# Patient Record
Sex: MALE | NOT HISPANIC OR LATINO | URBAN - METROPOLITAN AREA
[De-identification: names, ages, dates, MRNs, and addresses within clinical notes are randomized per-mention and may not be internally consistent; named-entity substitution may affect disease eponyms.]

---

## 2018-03-23 ENCOUNTER — HOSPITAL ENCOUNTER (INPATIENT)
Facility: REHABILITATION | Age: 63
LOS: 7 days | Discharge: HOME | DRG: 560 | End: 2018-03-30
Attending: PHYSICAL MEDICINE & REHABILITATION | Admitting: PHYSICAL MEDICINE & REHABILITATION
Payer: OTHER GOVERNMENT

## 2018-03-23 DIAGNOSIS — R60.0 BILATERAL LOWER EXTREMITY EDEMA: ICD-10-CM

## 2018-03-23 DIAGNOSIS — Z96.653 S/P TOTAL KNEE ARTHROPLASTY, BILATERAL: Primary | ICD-10-CM

## 2018-03-23 DIAGNOSIS — F34.1 DYSTHYMIC DISORDER: ICD-10-CM

## 2018-03-23 PROBLEM — R80.9 PROTEINURIA: Chronic | Status: ACTIVE | Noted: 2018-03-23

## 2018-03-23 PROBLEM — J45.20 MILD INTERMITTENT ASTHMA: Chronic | Status: ACTIVE | Noted: 2018-03-23

## 2018-03-23 PROBLEM — E11.9 DM2 (DIABETES MELLITUS, TYPE 2) (CMS/HCC): Chronic | Status: ACTIVE | Noted: 2018-03-23

## 2018-03-23 PROBLEM — E03.9 HYPOTHYROID: Chronic | Status: ACTIVE | Noted: 2018-03-23

## 2018-03-23 PROBLEM — J98.11 ATELECTASIS: Status: ACTIVE | Noted: 2018-03-23

## 2018-03-23 PROBLEM — D50.0 ANEMIA DUE TO BLOOD LOSS: Status: ACTIVE | Noted: 2018-03-23

## 2018-03-23 PROBLEM — E78.2 MIXED HYPERLIPIDEMIA: Chronic | Status: ACTIVE | Noted: 2018-03-23

## 2018-03-23 PROCEDURE — 63700000 HC SELF-ADMINISTRABLE DRUG: Performed by: HOSPITALIST

## 2018-03-23 PROCEDURE — 12800000 HC ROOM AND CARE SEMIPRIVATE REHAB

## 2018-03-23 PROCEDURE — 87086 URINE CULTURE/COLONY COUNT: CPT | Performed by: HOSPITALIST

## 2018-03-23 PROCEDURE — 81001 URINALYSIS AUTO W/SCOPE: CPT | Performed by: HOSPITALIST

## 2018-03-23 RX ORDER — MONTELUKAST SODIUM 10 MG/1
10 TABLET ORAL NIGHTLY
COMMUNITY

## 2018-03-23 RX ORDER — PANTOPRAZOLE SODIUM 40 MG/1
40 TABLET, DELAYED RELEASE ORAL
Status: DISCONTINUED | OUTPATIENT
Start: 2018-03-24 | End: 2018-03-30 | Stop reason: HOSPADM

## 2018-03-23 RX ORDER — ALBUTEROL SULFATE 2.5 MG/.5ML
2.5 SOLUTION RESPIRATORY (INHALATION) EVERY 6 HOURS PRN
Status: DISCONTINUED | OUTPATIENT
Start: 2018-03-23 | End: 2018-03-30 | Stop reason: HOSPADM

## 2018-03-23 RX ORDER — ACETAMINOPHEN 325 MG/1
650 TABLET ORAL EVERY 4 HOURS PRN
Status: DISCONTINUED | OUTPATIENT
Start: 2018-03-23 | End: 2018-03-30 | Stop reason: HOSPADM

## 2018-03-23 RX ORDER — CITALOPRAM 20 MG/1
40 TABLET, FILM COATED ORAL DAILY
Status: DISCONTINUED | OUTPATIENT
Start: 2018-03-24 | End: 2018-03-30 | Stop reason: HOSPADM

## 2018-03-23 RX ORDER — LISINOPRIL 2.5 MG/1
1.25 TABLET ORAL DAILY
Status: DISCONTINUED | OUTPATIENT
Start: 2018-03-24 | End: 2018-03-30 | Stop reason: HOSPADM

## 2018-03-23 RX ORDER — ALBUTEROL SULFATE 90 UG/1
2 INHALANT RESPIRATORY (INHALATION) EVERY 4 HOURS PRN
COMMUNITY

## 2018-03-23 RX ORDER — IBUPROFEN 200 MG
16-32 TABLET ORAL AS NEEDED
Status: DISCONTINUED | OUTPATIENT
Start: 2018-03-23 | End: 2018-03-30 | Stop reason: HOSPADM

## 2018-03-23 RX ORDER — DEXTROSE 50 % IN WATER (D50W) INTRAVENOUS SYRINGE
25 AS NEEDED
Status: DISCONTINUED | OUTPATIENT
Start: 2018-03-23 | End: 2018-03-30 | Stop reason: HOSPADM

## 2018-03-23 RX ORDER — BISACODYL 10 MG/1
10 SUPPOSITORY RECTAL DAILY PRN
Status: DISCONTINUED | OUTPATIENT
Start: 2018-03-23 | End: 2018-03-30 | Stop reason: HOSPADM

## 2018-03-23 RX ORDER — LEVOTHYROXINE SODIUM 88 UG/1
88 TABLET ORAL DAILY
COMMUNITY

## 2018-03-23 RX ORDER — ASPIRIN 325 MG
325 TABLET, DELAYED RELEASE (ENTERIC COATED) ORAL DAILY
Status: DISCONTINUED | OUTPATIENT
Start: 2018-03-24 | End: 2018-03-30 | Stop reason: HOSPADM

## 2018-03-23 RX ORDER — ADHESIVE BANDAGE
30 BANDAGE TOPICAL 2 TIMES DAILY PRN
Status: DISCONTINUED | OUTPATIENT
Start: 2018-03-23 | End: 2018-03-30 | Stop reason: HOSPADM

## 2018-03-23 RX ORDER — MONTELUKAST SODIUM 10 MG/1
10 TABLET ORAL NIGHTLY
Status: DISCONTINUED | OUTPATIENT
Start: 2018-03-23 | End: 2018-03-30 | Stop reason: HOSPADM

## 2018-03-23 RX ORDER — AMOXICILLIN 250 MG
1 CAPSULE ORAL 2 TIMES DAILY
Status: DISCONTINUED | OUTPATIENT
Start: 2018-03-23 | End: 2018-03-30 | Stop reason: HOSPADM

## 2018-03-23 RX ORDER — CELECOXIB 200 MG/1
200 CAPSULE ORAL DAILY
Status: DISCONTINUED | OUTPATIENT
Start: 2018-03-24 | End: 2018-03-30 | Stop reason: HOSPADM

## 2018-03-23 RX ORDER — CITALOPRAM 40 MG/1
40 TABLET, FILM COATED ORAL DAILY
COMMUNITY

## 2018-03-23 RX ORDER — DEXTROSE 40 %
15-30 GEL (GRAM) ORAL AS NEEDED
Status: DISCONTINUED | OUTPATIENT
Start: 2018-03-23 | End: 2018-03-30 | Stop reason: HOSPADM

## 2018-03-23 RX ORDER — OXYCODONE HYDROCHLORIDE 5 MG/1
5-10 TABLET ORAL EVERY 4 HOURS PRN
Status: DISCONTINUED | OUTPATIENT
Start: 2018-03-23 | End: 2018-03-30 | Stop reason: HOSPADM

## 2018-03-23 RX ORDER — CEPHALEXIN 500 MG/1
500 CAPSULE ORAL 4 TIMES DAILY
Status: DISCONTINUED | OUTPATIENT
Start: 2018-03-23 | End: 2018-03-30 | Stop reason: HOSPADM

## 2018-03-23 RX ORDER — ALUMINUM HYDROXIDE, MAGNESIUM HYDROXIDE, AND SIMETHICONE 1200; 120; 1200 MG/30ML; MG/30ML; MG/30ML
30 SUSPENSION ORAL EVERY 6 HOURS PRN
Status: DISCONTINUED | OUTPATIENT
Start: 2018-03-23 | End: 2018-03-30 | Stop reason: HOSPADM

## 2018-03-23 RX ORDER — SIMVASTATIN 40 MG/1
80 TABLET, FILM COATED ORAL NIGHTLY
Status: DISCONTINUED | OUTPATIENT
Start: 2018-03-24 | End: 2018-03-30 | Stop reason: HOSPADM

## 2018-03-23 RX ORDER — LEVOTHYROXINE SODIUM 88 UG/1
88 TABLET ORAL
Status: DISCONTINUED | OUTPATIENT
Start: 2018-03-24 | End: 2018-03-30 | Stop reason: HOSPADM

## 2018-03-23 RX ORDER — OXYCODONE HYDROCHLORIDE 5 MG/1
5 TABLET ORAL EVERY 4 HOURS PRN
Status: DISCONTINUED | OUTPATIENT
Start: 2018-03-23 | End: 2018-03-23

## 2018-03-23 RX ORDER — PREGABALIN 50 MG/1
50 CAPSULE ORAL 2 TIMES DAILY
Status: DISCONTINUED | OUTPATIENT
Start: 2018-03-23 | End: 2018-03-30 | Stop reason: HOSPADM

## 2018-03-23 RX ORDER — LISINOPRIL 5 MG/1
2.5 TABLET ORAL DAILY
COMMUNITY

## 2018-03-23 RX ORDER — EZETIMIBE AND SIMVASTATIN 10; 80 MG/1; MG/1
1 TABLET ORAL DAILY
COMMUNITY

## 2018-03-23 RX ORDER — ERGOCALCIFEROL 1.25 MG/1
50000 CAPSULE ORAL 2 TIMES WEEKLY
COMMUNITY

## 2018-03-23 RX ORDER — ONDANSETRON 4 MG/1
4 TABLET, ORALLY DISINTEGRATING ORAL EVERY 8 HOURS PRN
Status: DISCONTINUED | OUTPATIENT
Start: 2018-03-23 | End: 2018-03-30 | Stop reason: HOSPADM

## 2018-03-23 RX ORDER — POLYETHYLENE GLYCOL 3350 17 G/17G
17 POWDER, FOR SOLUTION ORAL DAILY PRN
Status: DISCONTINUED | OUTPATIENT
Start: 2018-03-23 | End: 2018-03-30 | Stop reason: HOSPADM

## 2018-03-23 RX ORDER — EZETIMIBE 10 MG/1
10 TABLET ORAL NIGHTLY
Status: DISCONTINUED | OUTPATIENT
Start: 2018-03-24 | End: 2018-03-30 | Stop reason: HOSPADM

## 2018-03-23 RX ADMIN — SENNOSIDES AND DOCUSATE SODIUM 1 TABLET: 8.6; 5 TABLET ORAL at 20:17

## 2018-03-23 RX ADMIN — CEPHALEXIN 500 MG: 500 CAPSULE ORAL at 20:17

## 2018-03-23 RX ADMIN — OXYCODONE HYDROCHLORIDE 10 MG: 5 TABLET ORAL at 20:19

## 2018-03-23 RX ADMIN — MONTELUKAST SODIUM 10 MG: 10 TABLET, FILM COATED ORAL at 20:18

## 2018-03-23 RX ADMIN — PREGABALIN 50 MG: 50 CAPSULE ORAL at 20:24

## 2018-03-23 RX ADMIN — ACETAMINOPHEN 650 MG: 325 TABLET, FILM COATED ORAL at 20:19

## 2018-03-23 NOTE — CONSULTS
[facility] Internal Medicine  Consult Note    Subjective     Carl Chow is a 62 y.o. male who was admitted for S/P total knee arthroplasty, bilateral [Z96.653]. Patient was referred by Robert Luo MD for patient co-management.     CC: bilat TKA    HPI: Carl Chow is a 61 yo man with asthma, depression, Dm2, hypothyroid, hyperlipidemia, proteinuria and OA/DJD who underwent bilat TKA by Dr. Kishore Choe at [facility] 3/19/18. Post op he had anemia but did not require transfusion. He was put on ASA 325mg daily for DVT ppx. His pain was managed with Percocet, Lyrica, and Celebrex.  He was also put on prophylactic Keflex.  He remained on Synthroid for hypothyroid, Vytorin for hyperlipidemia and Lisinopril for proteinuria. His mood remained stable on Celexa.  His asthma was controlled with Singulair. His Dm2 has been diet controlled since he had gastric bypass.     He had residual deficits in ambulation and ADLs due to bilat TKA and he came to [facility] 3/23/18 for acute inpatient rehab.     SUBJECTIVE:  Patient interviewed and examined    Still with some knee pain, some constipation, no abdominal pain or nausea, no dysuria, no chest pain, palpitations, dyspnea or fevers    ROS: as above, otherwise noncontributory  Current meds and allergies reviewed    Outside records reviewed. Pertinent radiology results reviewed. Pertinent lab results reviewed.    Medical History:   Past Medical History:   Diagnosis Date   • Asthma    • Depression    • DM2 (diabetes mellitus, type 2) (CMS/[facility]) ([facility])    • Hypothyroid    • Mixed hyperlipidemia    • Osteoarthritis of multiple joints    • Proteinuria        Surgical History:   Past Surgical History:   Procedure Laterality Date   • APPENDECTOMY     • GASTRIC BYPASS     • REPLACEMENT TOTAL KNEE BILATERAL Bilateral 03/19/2018   • WISDOM TOOTH EXTRACTION         Allergies: Crestor [rosuvastatin]    Current Facility-Administered Medications   Medication Dose Route Frequency  Provider Last Rate Last Dose   • acetaminophen (TYLENOL) tablet 650 mg  650 mg oral q4h PRN Adams Hebert MD       • albuterol nebulizer solution 2.5 mg  2.5 mg nebulization q6h PRN Adams Hebert MD       • alum-mag hydroxide-simeth (MAALOX) 200-200-20 mg/5 mL suspension 30 mL  30 mL oral q6h PRN Adams Hebert MD       • [START ON 3/24/2018] aspirin EC tablet 325 mg  325 mg oral Daily Adams Hebert MD       • bisacodyl (DULCOLAX) 10 mg suppository 10 mg  10 mg rectal Daily PRN Adams Hebert MD       • [START ON 3/24/2018] celecoxib (CeleBREX) capsule 200 mg  200 mg oral Daily Adams Hebert MD       • cephalexin (KEFLEX) capsule 500 mg  500 mg oral QID Adams Hebert MD       • [START ON 3/24/2018] citalopram (CELEXA) tablet 40 mg  40 mg oral Daily Adams Hebert MD       • glucose chewable tablet 16-32 g of dextrose  16-32 g of dextrose oral PRN Adams Hebert MD        Or   • dextrose 40 % oral gel 15-30 g of dextrose  15-30 g of dextrose oral PRN Adams Hebert MD        Or   • glucagon (GLUCAGEN) injection 1 mg  1 mg intramuscular PRN Adams Hebert MD        Or   • dextrose in water injection 12.5 g  25 mL intravenous PRN Adams Hebert MD       • [START ON 3/24/2018] levothyroxine (SYNTHROID) tablet 88 mcg  88 mcg oral Daily (6:30a) Adams Hebert MD       • [START ON 3/24/2018] lisinopril (PRINIVIL) tablet 1.25 mg  1.25 mg oral Daily Adams Hebert MD       • magnesium hydroxide (M.O.M.) 400 mg/5 mL suspension 30 mL  30 mL oral 2x daily PRN Adams Hebert MD       • montelukast (SINGULAIR) tablet 10 mg  10 mg oral Nightly dAams Hebert MD       • NON FORMULARY REQUEST  1 tablet oral Nightly Adams Hebert MD       • ondansetron ODT (ZOFRAN-ODT) disintegrating tablet 4 mg  4 mg oral q8h PRN Adams Hebert MD       • oxyCODONE (ROXICODONE) immediate release tablet 5 mg  5 mg oral q4h PRN Adams Hebert MD       • [START ON  3/24/2018] pantoprazole (PROTONIX) tablet,delayed release (DR/EC) 40 mg  40 mg oral Daily before breakfast Adams Hebert MD       • polyethylene glycol (MIRALAX) 17 gram packet 17 g  17 g oral Daily PRN Adams Hebert MD       • pregabalin (LYRICA) capsule 50 mg  50 mg oral BID Adams Hebert MD       • sennosides-docusate sodium (SENOKOT-S) 8.6-50 mg per tablet 1 tablet  1 tablet oral BID Adams Hebert MD           Social History:   Social History     Social History   • Marital status:      Spouse name: N/A   • Number of children: N/A   • Years of education: N/A     Social History Main Topics   • Smoking status: None   • Smokeless tobacco: None   • Alcohol use 1-2 drinks per month   • Drug use: None   • Sexual activity: Not Asked     Other Topics Concern   • None     Social History Narrative   • Works as medical social worker       Family History:   Family History   Problem Relation Age of Onset   • No Known Problems Mother    • Stroke Father        Review of Systems  Constitutional: negative for fevers  Eyes: negative for blurred vision  Ears, nose, mouth, throat, and face: negative for nasal congestion  Respiratory: negative for cough, shortness of breath and wheezing  Cardiovascular: negative for chest pain and palpitations  Gastrointestinal: positive for constipation, negative for abdominal pain, nausea and vomiting  Genitourinary:negative for decreased stream and painful urination  Integument/breast: negative for rash and itching  Musculoskeletal:positive for joint pain bilat knees and joint swelling bilat knees  Neurological: negative for numbness and tingling  Behavioral/Psych: negative for anxiety and depression  Endocrine: negative    Vital signs in last 24 hours:  BP: ()/()   Arterial Line BP: ()/()   Vital signs reviewed 03/23/18 5:36 PM    Objective     Physical Exam  General appearance: alert, appears stated age and cooperative  Head: normocephalic, without obvious abnormality,  atraumatic  Eyes: conjunctivae clear. PERRL, EOM's intact.  Ears: normal external ear  Nose: Nares normal. Septum midline. Mucosa normal.  Throat: normal oropharynx  Neck: no JVD, no adenopathy, no carotid bruit, supple, symmetrical, trachea midline and thyroid not enlarged, symmetric, no tenderness/mass/nodules  Lungs: clear to auscultation bilaterally and decreased at bases  Heart: regular rate and rhythm, S1, S2 normal, no murmur, click, rub or gallop  Abdomen: soft, non-tender; bowel sounds normal; no masses, no organomegaly  Extremities: edema 2+ bilat LE  and s/p bilat TKA  Pulses: 2+ and symmetric all four extremities  Skin: Skin color, texture, turgor normal. No rashes or lesions or knees C/D/I without dressing  Lymph nodes: Cervical and supraclavicular nodes normal.  Neurologic: Alert and oriented X 3  decreased strength at knees bilat due to pain    Labs  I have reviewed the patient's pertinent labs.  Significant abnormals are anemia.   3/22/18: wbc 9.1, h/h 11.9/35, plt 201  3/21/18: na 137, k 4.4, cl 101, co2 27, bun 25, cr 1.0, glu 103, ca 8.8    Imaging  Not applicable      Assessment     61 yo man with asthma, depression, Dm2, hypothyroid, hyperlipidemia, proteinuria and OA/DJD who underwent bilat TKA by Dr. Kishore Choe at [facility] 3/19/18    1. Ortho:  -OA/DJD s/p bilat TKA   -Tylenol, Lyrica and Oxycodone for pain, titrate as needed     2. Vasc:  -bilat LE edema  -SCDs and ASA for DVT ppx  -check doppler US bilat LE's     3. Heme:  -post op anemia, does not need iron at this time  -check B12/folate due to hx of gastric bypass  -follow CBC     4. Renal:  -proteinuria on low dose Lisinopril  -increased risk of dehydration and electrolyte changes  -follow BMP, Mg     5. Gi:  -Senokot-S for bowels  -Protonix ulcer ppx     6. Gu:  -increased risk of UTI and retention  -check UA/C+S and check PVRs     7. Cardiac:  -watch for orthostatic hypotension  -use cardiac precautions in therapy     8.  Pulm:  -mild intermittent asthma  -incentive spirometry for atelectasis     9. Derm:  -continue current wound care     10. Nutrition:  -hx of obesity s/p gastric bypass  -consulted Nutrition of assessment and education    11. Endo:  -hypothyroid on Synthroid  -HL on Vytorin  -Dm2 diet controlled    12. Psych:  -depression on Celexa, mood stable     Orders personally entered in CPOE    plan discussed with patient, nurse, case management and MD Adams Dill MD  03/23/18  5:41 PM

## 2018-03-24 ENCOUNTER — APPOINTMENT (INPATIENT)
Dept: PHYSICAL THERAPY | Facility: REHABILITATION | Age: 63
DRG: 560 | End: 2018-03-24
Payer: OTHER GOVERNMENT

## 2018-03-24 ENCOUNTER — APPOINTMENT (INPATIENT)
Dept: OCCUPATIONAL THERAPY | Facility: REHABILITATION | Age: 63
DRG: 560 | End: 2018-03-24
Payer: OTHER GOVERNMENT

## 2018-03-24 PROBLEM — G89.18 POSTOPERATIVE PAIN: Status: ACTIVE | Noted: 2018-03-24

## 2018-03-24 PROBLEM — F32.A DEPRESSION: Status: ACTIVE | Noted: 2018-03-24

## 2018-03-24 LAB
ALBUMIN SERPL-MCNC: 3.5 G/DL (ref 3.4–5)
ALP SERPL-CCNC: 207 IU/L (ref 35–126)
ALT SERPL-CCNC: 59 IU/L (ref 16–63)
ANION GAP SERPL CALC-SCNC: 5 MEQ/L (ref 3–15)
AST SERPL-CCNC: 38 IU/L (ref 15–41)
BACTERIA #/AREA URNS HPF: ABNORMAL /HPF
BASOPHILS # BLD: 0.04 K/UL (ref 0.01–0.1)
BASOPHILS NFR BLD: 0.5 %
BILIRUB SERPL-MCNC: 1.6 MG/DL (ref 0.3–1.2)
BILIRUB UR QL STRIP.AUTO: NEGATIVE MG/DL
BUN SERPL-MCNC: 17 MG/DL (ref 8–20)
CALCIUM SERPL-MCNC: 8.9 MG/DL (ref 8.9–10.3)
CHLORIDE SERPL-SCNC: 99 MMOL/L (ref 98–109)
CLARITY UR REFRACT.AUTO: CLEAR
CO2 SERPL-SCNC: 32 MMOL/L (ref 22–32)
COLOR UR AUTO: YELLOW
CREAT SERPL-MCNC: 0.8 MG/DL (ref 0.8–1.3)
EOSINOPHIL # BLD: 0.24 K/UL (ref 0.04–0.54)
EOSINOPHIL NFR BLD: 3 %
ERYTHROCYTE [DISTWIDTH] IN BLOOD BY AUTOMATED COUNT: 13.4 % (ref 11.6–14.4)
FOLATE SERPL-MCNC: 5.1 NG/ML
GFR SERPL CREATININE-BSD FRML MDRD: >60 ML/MIN/1.73M*2
GLUCOSE SERPL-MCNC: 87 MG/DL (ref 70–99)
GLUCOSE UR STRIP.AUTO-MCNC: NEGATIVE MG/DL
HCT VFR BLDCO AUTO: 37.4 % (ref 40–51)
HGB BLD-MCNC: 12.2 G/DL (ref 13.7–17.5)
HGB UR QL STRIP.AUTO: NEGATIVE
HYALINE CASTS #/AREA URNS LPF: ABNORMAL /LPF
IMM GRANULOCYTES # BLD AUTO: 0.03 K/UL (ref 0–0.08)
IMM GRANULOCYTES NFR BLD AUTO: 0.4 %
KETONES UR STRIP.AUTO-MCNC: ABNORMAL MG/DL
LEUKOCYTE ESTERASE UR QL STRIP.AUTO: NEGATIVE
LYMPHOCYTES # BLD: 2.34 K/UL (ref 1.2–3.5)
LYMPHOCYTES NFR BLD: 29.6 %
MAGNESIUM SERPL-MCNC: 2.1 MG/DL (ref 1.8–2.5)
MCH RBC QN AUTO: 29.5 PG (ref 28–33.2)
MCHC RBC AUTO-ENTMCNC: 32.6 G/DL (ref 32.2–36.5)
MCV RBC AUTO: 90.3 FL (ref 83–98)
MONOCYTES # BLD: 0.86 K/UL (ref 0.3–1)
MONOCYTES NFR BLD: 10.9 %
NEUTROPHILS # BLD: 4.39 K/UL (ref 1.7–7)
NEUTS SEG NFR BLD: 55.6 %
NITRITE UR QL STRIP.AUTO: NEGATIVE
NRBC BLD-RTO: 0 %
PDW BLD AUTO: 8.7 FL (ref 9.4–12.4)
PH UR STRIP.AUTO: 6 [PH]
PLATELET # BLD AUTO: 267 K/UL (ref 150–350)
POTASSIUM SERPL-SCNC: 4.2 MMOL/L (ref 3.6–5.1)
PROT SERPL-MCNC: 6.5 G/DL (ref 6–8.2)
PROT UR QL STRIP.AUTO: ABNORMAL
RBC # BLD AUTO: 4.14 M/UL (ref 4.5–5.8)
RBC #/AREA URNS HPF: ABNORMAL /HPF
SODIUM SERPL-SCNC: 136 MMOL/L (ref 136–144)
SP GR UR REFRACT.AUTO: 1.01
SQUAMOUS #/AREA URNS HPF: ABNORMAL /HPF
UROBILINOGEN UR STRIP-ACNC: 1 E.U./DL
VIT B12 SERPL-MCNC: 846 PG/ML (ref 180–914)
WBC # BLD AUTO: 7.9 K/UL (ref 3.8–10.5)
WBC #/AREA URNS HPF: ABNORMAL /HPF

## 2018-03-24 PROCEDURE — 82607 VITAMIN B-12: CPT | Performed by: HOSPITALIST

## 2018-03-24 PROCEDURE — 80053 COMPREHEN METABOLIC PANEL: CPT | Performed by: HOSPITALIST

## 2018-03-24 PROCEDURE — 97150 GROUP THERAPEUTIC PROCEDURES: CPT | Mod: GP

## 2018-03-24 PROCEDURE — 85025 COMPLETE CBC W/AUTO DIFF WBC: CPT | Performed by: HOSPITALIST

## 2018-03-24 PROCEDURE — 83735 ASSAY OF MAGNESIUM: CPT | Performed by: HOSPITALIST

## 2018-03-24 PROCEDURE — 97535 SELF CARE MNGMENT TRAINING: CPT | Mod: GO

## 2018-03-24 PROCEDURE — 12800000 HC ROOM AND CARE SEMIPRIVATE REHAB

## 2018-03-24 PROCEDURE — 63700000 HC SELF-ADMINISTRABLE DRUG: Performed by: HOSPITALIST

## 2018-03-24 PROCEDURE — 36415 COLL VENOUS BLD VENIPUNCTURE: CPT | Performed by: HOSPITALIST

## 2018-03-24 PROCEDURE — 82746 ASSAY OF FOLIC ACID SERUM: CPT | Performed by: HOSPITALIST

## 2018-03-24 PROCEDURE — 97110 THERAPEUTIC EXERCISES: CPT | Mod: GP | Performed by: PHYSICAL THERAPIST

## 2018-03-24 PROCEDURE — 97163 PT EVAL HIGH COMPLEX 45 MIN: CPT | Mod: GP | Performed by: PHYSICAL THERAPIST

## 2018-03-24 PROCEDURE — 97167 OT EVAL HIGH COMPLEX 60 MIN: CPT | Mod: GO

## 2018-03-24 RX ADMIN — PREGABALIN 50 MG: 50 CAPSULE ORAL at 08:42

## 2018-03-24 RX ADMIN — PANTOPRAZOLE SODIUM 40 MG: 40 TABLET, DELAYED RELEASE ORAL at 08:42

## 2018-03-24 RX ADMIN — CELECOXIB 200 MG: 200 CAPSULE ORAL at 08:38

## 2018-03-24 RX ADMIN — CEPHALEXIN 500 MG: 500 CAPSULE ORAL at 08:39

## 2018-03-24 RX ADMIN — ASPIRIN 325 MG: 325 TABLET, COATED ORAL at 12:38

## 2018-03-24 RX ADMIN — MONTELUKAST SODIUM 10 MG: 10 TABLET, FILM COATED ORAL at 20:50

## 2018-03-24 RX ADMIN — LEVOTHYROXINE SODIUM 88 MCG: 88 TABLET ORAL at 06:44

## 2018-03-24 RX ADMIN — OXYCODONE HYDROCHLORIDE 10 MG: 5 TABLET ORAL at 08:45

## 2018-03-24 RX ADMIN — LISINOPRIL 1.25 MG: 2.5 TABLET ORAL at 12:41

## 2018-03-24 RX ADMIN — PREGABALIN 50 MG: 50 CAPSULE ORAL at 20:49

## 2018-03-24 RX ADMIN — OXYCODONE HYDROCHLORIDE 10 MG: 5 TABLET ORAL at 04:09

## 2018-03-24 RX ADMIN — SENNOSIDES AND DOCUSATE SODIUM 1 TABLET: 8.6; 5 TABLET ORAL at 08:43

## 2018-03-24 RX ADMIN — CEPHALEXIN 500 MG: 500 CAPSULE ORAL at 12:39

## 2018-03-24 RX ADMIN — CEPHALEXIN 500 MG: 500 CAPSULE ORAL at 16:54

## 2018-03-24 RX ADMIN — CEPHALEXIN 500 MG: 500 CAPSULE ORAL at 20:49

## 2018-03-24 RX ADMIN — ACETAMINOPHEN 650 MG: 325 TABLET, FILM COATED ORAL at 20:49

## 2018-03-24 RX ADMIN — CITALOPRAM HYDROBROMIDE 40 MG: 20 TABLET, FILM COATED ORAL at 08:40

## 2018-03-24 RX ADMIN — OXYCODONE HYDROCHLORIDE 10 MG: 5 TABLET ORAL at 12:36

## 2018-03-24 RX ADMIN — OXYCODONE HYDROCHLORIDE 10 MG: 5 TABLET ORAL at 00:17

## 2018-03-24 RX ADMIN — OXYCODONE HYDROCHLORIDE 5 MG: 5 TABLET ORAL at 16:55

## 2018-03-24 RX ADMIN — SENNOSIDES AND DOCUSATE SODIUM 1 TABLET: 8.6; 5 TABLET ORAL at 20:49

## 2018-03-24 NOTE — PLAN OF CARE
Problem: Physical Therapy Goals (Impaired Functional Mobility)  Goal: Bed-Chair Transfer Goals, PT  Outcome: Ongoing (interventions implemented as appropriate)   03/24/18 1305   Bed-Chair Transfer Goal, PT   PT STG: Bed-Chair Transfer modified independence;verbal cues required   Physical Therapy STG Date Established: Bed Chair Transfer 03/24/18  (Using a RWalker.)   PT STG Duration: Bed-Chair Transfer 5 days or less   PT LTG: Bed-Chair Transfer modified independence   Physical Therapy LTG Date Established: Bed Chair Transfer 03/24/18  (With or without a RWalker.)     Goal: Stairs Goal  Outcome: Ongoing (interventions implemented as appropriate)   03/24/18 1305   Stair Goal, PT   PT STG: Stairs minimum assist (75% or less patient effort)   STG Number of Stairs 8  (using both HR.)   Physical Therapy STG Date Established: Stairs 03/24/18   PT STG Duration: Stairs 5 days or less   PT LTG: Stairs modified independence   LTG Number of Stairs 15  (using 1 HR and an SPC.)   Physical Therapy LTG Date Established: Stairs 03/24/18   PT LTG Duration: Stairs 21 days or less     Goal: Walking/Gait Goals  Outcome: Ongoing (interventions implemented as appropriate)   03/24/18 1305   Walking Locomotion Goal, PT   PT STG: Walking Locomotion supervision required;verbal cues required   Physical Therapy STG Date Established: Walking Locomotion 03/24/18  (Distances > than or = to 175'.)   PT STG Duration: Walking Locomotion 5 days or less   PT LTG: Walking Locomotion modified independence   Physical Therapy LTG Date Established: Walking Locomotion 03/24/18  (Distances > than or = to 300'.)   PT LTG Duration: Walking Locomotion 21 days or less     Goal: Additional Goals, PT  Outcome: Ongoing (interventions implemented as appropriate)   03/24/18 1305   Additional Goal, PT   Physical Therapy STG: Date Established 03/24/18  (Pt. will increase B Knee PROM by > 10 degrees.)   PT STG Duration 5 days or less   Physical Therapy LTG: Date  Established 03/24/18  (Pt. will increase B Knee PROM to > or = to 0-115 degrees.)   PT LTG Duration 21 days or less

## 2018-03-24 NOTE — ASSESSMENT & PLAN NOTE
Bilateral total knee arthroplasties by Kishore Choe MD of orthopedic surgery at [facility] on March 19, 2018.  Patient tolerated surgery well.       Weight-bear as tolerated post surgery.    Complete empiric Keflex.    Apprehensive inpatient rehabilitation program to address functional deficits status post bilateral knee replacements.  Goals of modified independent to supervision with mobility self-care activities and return home.

## 2018-03-24 NOTE — PROGRESS NOTES
Patient: Carl Sessions  Location: [facility] [address]  MRN: [id number]  Today's date: 3/24/2018          Pain/Vitals - 03/24/18 1108        Pain/Comfort/Sleep    Presence of Pain complains of pain/discomfort    Preferred Pain Scale number (Numeric Rating Pain Scale)    Pain Body Location - Side Bilateral    Pain Body Location knee    Pain Rating (0-10): Rest 5    Pain Management Interventions cold applied       Vital Signs    Pulse 80    SpO2 95 %    Patient Activity At rest    /75          Prior Living Environment  Lives With: spouse  Living Arrangements: house  Home Accessibility: stairs to enter home, other (see comments)  Living Environment Comment: Pt's 2 story home has 1+1 NILE, Bed and full bath on 2nd flr., 1/2 bath on 1st,  2nd floor bath has a small built-in corner bench.  DME; RWalker, Power scooter, tub seat, reacher, long handled shoe horn, sock thomas, dressing stick, and long handled sponge.     Location, Bathroom: second floor, must negotiate stairs to access  Bathroom Access Comment:       Prior Level of Function  Ambulation: independent  Transferring: independent  Toileting: independent  Bathing: independent  Dressing: independent  Eating: independent  Communication: understands/communicates without difficulty  Swallowing: swallows foods/liquids without difficulty  Prior Functional Level Comment:  (has shower bench,RTS, RW, getting grab bars)Dominant Hand: left          OT Evaluation - 03/24/18 1107        Session Details    Document Type initial evaluation    Mode of Treatment individual therapy;occupational therapy       Time Calculation    Start Time 1105    Stop Time 1150    Time Calculation (min) 45 min       Comprehension    Primary Mode of Comprehension Auditory    Assistive Device reading glasses    Glasses [place] patient applies glasses    Basic Directions and Conversations Understands without difficulty    Complex Directions and Conversations  Understands without difficulty       Memory    Memory [place] Level Recognizes and remembers without difficulty       Problem Solving    Basic Problems Solves routine problems without difficulty    Complex Problems Solves without difficulty       Social Interaction    Social Interaction Interacts appropriately with others       Expression    Primary Mode of Expression Vocal    Basic Needs and Ideas Expresses without difficulty    Complex or Abstract Ideas Expresses without difficulty       Shower Transfer Training    Comment Pt will require DME for transfer       Toilet Transfer    Comment Pt will require DME for transfer       Upper Body Dressing    Upper Body Dressing Tasks pull over garment    Upper Body Dressing Self-Performance threads left arm, shirt;threads right arm, shirt;pulls shirt over head/around back;pulls shirt down/adjusts    [place] Assistance obtain clothes    Upper Body Dressing Position edge of bed sitting    Upper Body [place] supervision       Lower Body Dressing    Lower Body Dressing Tasks don;doff;socks;pants/bottoms;underwear    Lower Body Dressing Self-Performance threads left leg;threads right leg;pulls underpants up or down;pulls pants up or down;dons/doffs socks    [place] Assistance obtains clothes    Lower Body Dressing Position edge of bed sitting    Lower Body Dressing [place] supervision       Bathing    Self-Performance chest;left arm;right arm;abdomen;front perineal area;buttocks;left upper leg;right upper leg;left lower leg, including foot;right lower leg, including foot    [place] Assistance safety considerations    Bathing Position supported sitting    Comment Supervision       Grooming    Self-Performance washes, rinses and dries face;washes, rinses and dries hands;oral care (brushing teeth, cleaning dentures;shaves face    Grooming Position supported standing;unsupported sitting    [place] supervision       OT Clinical Impression    Patient's Goals For  Discharge return home;take care of myself at home    Plan For Care Reviewed: Occupational Therapy patient voices agreement with OT plan for care    Functional Limitations in Following Categories self-care;work    Rehab Potential/Prognosis: Occupational Therapy good, to achieve stated therapy goals    Frequency of Treatment 5-7 times per week;60 minutes per day    Problem List: Occupational Therapy ROM decreased;impaired balance;pain    Activity Limitations Related to Problem List ambulation not performed safely;BADL activities not performed adequately or safely;IADLs not performed adequately or safely;functional mobility not performed adequately or safely for household activity    Expected Discharge Disposition home;other (see comments)   Home with family who will not always be there during the day    Daily Outcome Statement Initial eval completed                        OT Care Plan Goals    Flowsheet Row Most Recent Value   Bathing Goal, OT   OT STG: Bathing  supervision required   OT STG Date Established: Bathing  03/24/18   OT STG Duration: Bathing  3 days or less   OT LTG: Bathing  modified independence   OT LTG Date Established: Bathing  03/24/18   OT LTG Duration: Bathing  14 days or less   Grooming Goal, OT   OT STG: Grooming  independent   OT STG Date Established: Grooming  03/24/18   OT STG Duration: Grooming  5 days or less   OT LTG: Grooming  independent   OT LTG Date Established: Grooming  03/24/18   OT LTG Duration: Grooming  14 days or less   Upper Body Dressing Goal, OT   OT STG: Upper Body Dressing  supervision required   Occupational Therapy STG Date Established: UB Dressing  03/24/18   OT STG Duration: Upper Body Dressing  5 days or less   OT LTG: Upper Body Dressing  modified independence   Occupational Therapy LTG Date Established: UB Dressing  03/24/18   OT LTG Duration: Upper Body Dressing  14 days or less   Lower Body Dressing Goal, OT   OT STG: Lower Body Dressing  supervision required   OT  STG Date Established: LB Dressing  03/24/18   OT STG Duration: Lower Body Dressing  3 days or less   OT LTG: Lower Body Dressing  modified independence   OT LTG Date Established: LB Dressing  03/24/18   OT LTG Duration: Lower Body Dressing  14 days or less   Toilet Transfer Goal, OT   OT STG: Toilet Transfer  supervision required   Occupational Therapy STG Date Established: Toilet Transfer  03/24/18   OT STG Duration: Toilet Transfer  5 days or less   OT LTG: Toilet Transfer  modified independence   Occupational Therapy LTG Date Established: Toilet Transfer  03/24/18   OT LTG Duration: Toilet Transfer  14 days or less   Toileting Goal, OT   OT STG: Toileting  supervision required   OT STG Date Established: Toileting  03/24/18   OT STG Duration: Toileting  3 days or less   OT LTG: Toileting  modified independence   OT LTG Date Established: Toileting  03/24/18   OT LTG Duration: Toileting  14 days or less   Tub-Shower Transfer Goal, OT   OT STG: Tub-Shower Transfer  supervision required   OT STG Date Established: Tub Shower Transfer  03/24/18   OT STG Duration: Tub-Shower Transfer  5 days or less   OT LTG: Tub-Shower Transfer  modified independence   OT LTG Date Established: Tub Shower Transfer  03/24/18   OT LTG Duration: Tub-Shower Transfer  14 days or less   Upper Body Dressing   Upper Body Dressing Tasks  pull over garment   Upper Body Dressing Self-Performance  threads left arm, shirt, threads right arm, shirt, pulls shirt over head/around back, pulls shirt down/adjusts   [place] Assistance  obtain clothes   Upper Body Dressing Position  edge of bed sitting   Upper Body [place]  supervision   FIM: Upper Body Dressing Score  5-->Supervision or Set Up

## 2018-03-24 NOTE — PROGRESS NOTES
Patient: Carl Sessions  Location: [facility] [address]  MRN: [id number]  Today's date: 3/24/2018         Prior Living Environment  Lives With: spouse  Living Arrangements: house  Home Accessibility: stairs to enter home, other (see comments)  Living Environment Comment: Pt's 2 story home has 1+1 NILE, Bed and full bath on 2nd flr., 1/2 bath on 1st,  2nd floor bath has a small built-in corner bench.  DME; RWalker, Power scooter, tub seat, reacher, long handled shoe horn, sock thomas, dressing stick, and long handled sponge.     Location, Bathroom: second floor, must negotiate stairs to access  Bathroom Access Comment:       Prior Level of Function  Ambulation: independent  Transferring: independent  Toileting: independent  Bathing: independent  Dressing: independent  Eating: independent  Communication: understands/communicates without difficulty  Swallowing: swallows foods/liquids without difficulty  Prior Functional Level Comment:  (has shower bench,RTS, RW, getting grab bars)Dominant Hand: left          OT Treatment Summary - 03/24/18 1300        Session Details    Document Type daily treatment    Mode of Treatment individual therapy;occupational therapy       Time Calculation    Start Time 1150    Stop Time 1205    Time Calculation (min) 15 min       Shower Transfer Training    [place] minimum assist (75% patient effort)    Assistive Device (Shower Transfer) walker, front-wheeled;tub bench;grab bars/tub rail    Comment Pt ambulated into bathroom and transferred into shower using grab bar and ext tub bench       Toilet Transfer    [place] close supervision    Assistive Device (Toilet Transfer) raised toilet seat;walker, front-wheeled;grab bars/safety frame    Transfer Techniques standing pivot       OT Clinical Impression    Daily Outcome Statement Pt performed bathroom transfers with DME                        OT Care Plan Goals    Flowsheet Row Most Recent Value   Bathing Goal,  OT   OT STG: Bathing  supervision required   OT STG Date Established: Bathing  03/24/18   OT STG Duration: Bathing  3 days or less   OT LTG: Bathing  modified independence   OT LTG Date Established: Bathing  03/24/18   OT LTG Duration: Bathing  14 days or less   Grooming Goal, OT   OT STG: Grooming  independent   OT STG Date Established: Grooming  03/24/18   OT STG Duration: Grooming  5 days or less   OT LTG: Grooming  independent   OT LTG Date Established: Grooming  03/24/18   OT LTG Duration: Grooming  14 days or less   Upper Body Dressing Goal, OT   OT STG: Upper Body Dressing  supervision required   Occupational Therapy STG Date Established: UB Dressing  03/24/18   OT STG Duration: Upper Body Dressing  5 days or less   OT LTG: Upper Body Dressing  modified independence   Occupational Therapy LTG Date Established: UB Dressing  03/24/18   OT LTG Duration: Upper Body Dressing  14 days or less   Lower Body Dressing Goal, OT   OT STG: Lower Body Dressing  supervision required   OT STG Date Established: LB Dressing  03/24/18   OT STG Duration: Lower Body Dressing  3 days or less   OT LTG: Lower Body Dressing  modified independence   OT LTG Date Established: LB Dressing  03/24/18   OT LTG Duration: Lower Body Dressing  14 days or less   Toilet Transfer Goal, OT   OT STG: Toilet Transfer  supervision required   Occupational Therapy STG Date Established: Toilet Transfer  03/24/18   OT STG Duration: Toilet Transfer  5 days or less   OT LTG: Toilet Transfer  modified independence   Occupational Therapy LTG Date Established: Toilet Transfer  03/24/18   OT LTG Duration: Toilet Transfer  14 days or less   Toileting Goal, OT   OT STG: Toileting  supervision required   OT STG Date Established: Toileting  03/24/18   OT STG Duration: Toileting  3 days or less   OT LTG: Toileting  modified independence   OT LTG Date Established: Toileting  03/24/18   OT LTG Duration: Toileting  14 days or less   Tub-Shower Transfer Goal, OT   OT  STG: Tub-Shower Transfer  supervision required   OT STG Date Established: Tub Shower Transfer  03/24/18   OT STG Duration: Tub-Shower Transfer  5 days or less   OT LTG: Tub-Shower Transfer  modified independence   OT LTG Date Established: Tub Shower Transfer  03/24/18   OT LTG Duration: Tub-Shower Transfer  14 days or less   Upper Body Dressing   Upper Body Dressing Tasks  pull over garment   Upper Body Dressing Self-Performance  threads left arm, shirt, threads right arm, shirt, pulls shirt over head/around back, pulls shirt down/adjusts   [place] Assistance  obtain clothes   Upper Body Dressing Position  edge of bed sitting   Upper Body [place]  supervision   FIM: Upper Body Dressing Score  5-->Supervision or Set Up

## 2018-03-24 NOTE — SUBJECTIVE & OBJECTIVE
Admitting diagnosis: S/P total knee arthroplasty, bilateral [Z96.653]  Patient is a 62 y.o. male who presents with past medical history of asthma, depression, hyperlipidemia, hypothyroidism, proteinuria, and osteoarthritis, as well as type 2 diabetes.  Patient has a history of obesity and underwent gastric sleeve bypass surgery.  The patient failed conservative measures for his knee osteoarthritis and underwent bilateral total knee arthroplasties by Kishore Choe MD of orthopedic surgery at [facility] on March 19, 2018.  Patient tolerated surgery well.  He was allowed to weight-bear as tolerated post surgery.  Was placed on aspirin for DVT risk per orthopedic protocol.   For postoperative pain the patient was placed on oxycodone as needed.  In addition he was placed on Celebrex and Lyrica.  Depression was addressed and he was maintained on Celexa.  He was continued on Synthroid for his hypothyroidism.  For hyperlipidemia he was maintained on Vytorin.  For proteinuria the patient was continued on lisinopril.  For asthma he was maintained on Singulair.  Postoperatively the patient was treated with Ancef and then placed on Keflex empirically.  Patient requires min to mod assistance for mobility and self-care activities postsurgery/bilateral knee replacements.  He is now appropriate for acute inpatient rehabilitation to help him with functional deficits status post bilateral knee replacements.    Medical History:   Past Medical History:   Diagnosis Date   • Asthma    • Depression    • DM2 (diabetes mellitus, type 2) (CMS/[facility]) ([facility])    • Hypothyroid    • Mixed hyperlipidemia    • Osteoarthritis of multiple joints    • Proteinuria        Surgical History:   Past Surgical History:   Procedure Laterality Date   • APPENDECTOMY     • GASTRIC BYPASS     • REPLACEMENT TOTAL KNEE BILATERAL Bilateral 03/19/2018   • WISDOM TOOTH EXTRACTION         Social History:   Social History     Social History   • Marital status:       Spouse name: N/A   • Number of children: N/A   • Years of education: N/A     Social History Main Topics   • Smoking status: Never Smoker   • Smokeless tobacco: Never Used   • Alcohol use None   • Drug use: No   • Sexual activity: Not Asked     Other Topics Concern   • None     Social History Narrative   • None       Family History:   Family History   Problem Relation Age of Onset   • No Known Problems Mother    • Stroke Father        Allergies: Crestor [rosuvastatin]    [unfilled]    Review of Systems  All other systems reviewed and negative except as noted in the HPI.  Patient has bilateral knee pain post surgery.  He denies numbness tingling and pins and needles into the extremities.  Denies chest pain or shortness of breath.  Review of systems otherwise negative.    Vital Signs for the last 24 hours:  Temp:  [37.2 °C (99 °F)-37.3 °C (99.2 °F)] 37.3 °C (99.2 °F)  Heart Rate:  [74-77] 77  Resp:  [18] 18  BP: (118-121)/(58-76) 119/76      Physical Exam  General      Alert, cooperative, no distress, appears stated age.   Head:    Normocephalic, without obvious abnormality, atraumatic.   Eyes:    PERRL, conjunctiva/corneas clear, EOM's intact.        Nose:   Nares normal, septum midline, mucosa normal, no drainage or            sinus tenderness.   Throat:   Lips, mucosa, and tongue normal.    Neck:   Supple, symmetrical, trachea midline.    Back:     Symmetric, no curvature.   Lungs:     Clear to auscultation bilaterally, respirations unlabored.   Chest wall:    No tenderness or deformity.   Heart:    Regular rate and rhythm, S1 and S2 normal.   Abdomen:     Soft, non-tender, bowel sounds active all four quadrants,     no masses, no organomegaly.   Extremities:  Musculoskeletal:  1+ lower extremity edema bilaterally.   Bilateral total knee arthroplasties.      Pulses:   1+ and symmetric all extremities.   Skin:  Incisions healing well without drainage erythema and with just mild edema.    Neurologic:          Behavior/  Emotional:  CNII-XII intact.  Alert and oriented ×3.  Motor exam week quadriceps postsurgery.  Sensory exam intact.  Reflexes stable decreased reflexes.      Appropriate, cooperative       Labs  I have reviewed the patient's pertinent labs.  Significant abnormals are Mild anemia with hemoglobin of 12.2.    Imaging  Not applicable

## 2018-03-24 NOTE — PROGRESS NOTES
Patient: Carl Chow  Location: [facility] [address]  MRN: [id number]  Today's date: 3/24/2018          Pain/Vitals     Row Name 03/24/18 0906 03/24/18 0947 03/24/18 1108       Pain/Comfort/Sleep    Presence of Pain complains of pain/discomfort complains of pain/discomfort complains of pain/discomfort    Preferred Pain Scale number (Numeric Rating Pain Scale) number (Numeric Rating Pain Scale) number (Numeric Rating Pain Scale)    Pain Body Location - Side Bilateral Bilateral Bilateral    Pain Body Location - Orientation incisional incisional  --    Pain Body Location knee knee knee    Pain Rating (0-10): Rest 7 8 5    Pain Rating (0-10): Activity 9 9  --    Pain Rating: Rest 8 - severe pain 8 - severe pain  --    Quality aching;sharp aching;sharp  --    Pain Management Interventions  --  -- cold applied       Vital Signs    Pulse 77 80 80    Heart Rate Source Monitor Monitor  --    SpO2 98 % 98 % 95 %    Patient Activity At rest At rest At rest    Oxygen Therapy None (Room air) None (Room air)  --    /76 (!)  150/74 121/75    BP Location Left upper arm Left upper arm  --    BP Method Automatic Automatic  --    Patient Position Sitting Sitting  --       Patient Observation    Observations Pt. just received a pain medication 15 min prior to IE.  --  --    Row Name 03/24/18 1300             Pain/Comfort/Sleep    Presence of Pain complains of pain/discomfort      Preferred Pain Scale number (Numeric Rating Pain Scale)      Pain Body Location - Side Bilateral      Pain Body Location - Orientation incisional      Pain Body Location knee      Pain Rating (0-10): Rest 5      Pain Rating (0-10): Activity 8   with activity         Vital Signs    Pulse 82      /66      BP Location Left upper arm      BP Method Automatic      Patient Position Sitting            Prior Living Environment  Lives With: spouse  Living Arrangements: house  Home Accessibility: stairs to enter home, other (see  comments)  Living Environment Comment: Pt's 2 story home has 1+1 NILE, Bed and full bath on 2nd flr., 1/2 bath on 1st,  2nd floor bath has a small built-in corner bench.  DME; RWalker, Power scooter, tub seat, reacher, long handled shoe horn, sock thomas, dressing stick, and long handled sponge.     Location, Bathroom: second floor, must negotiate stairs to access  Bathroom Access Comment:       Prior Level of Function  Ambulation: independent  Transferring: independent  Toileting: independent  Bathing: independent  Dressing: independent  Eating: independent  Communication: understands/communicates without difficulty  Swallowing: swallows foods/liquids without difficulty  Prior Functional Level Comment:  (has shower bench,RTS, RW, getting grab bars)Dominant Hand: left          PT Treatment Summary - 03/24/18 1330        Session Details    Document Type daily treatment    Mode of Treatment group therapy;physical therapy       Time Calculation    Start Time 1330    Stop Time 1430    Time Calculation (min) 60 min       ROM: Left Knee    PROM Deficit: Extension/Flexion 5 - 95       ROM: Right Knee    PROM Deficit: Extension/Flexion 6 - 95       Bed Chair WC Transfer Training    Bed Mobility Assessment/Interventions supine to sit to supine    [place] close supervision    Assistive Device (Transfers) walker, front-wheeled    Bed-Chair Transfers, [place] close supervision    Chair-Bed Transfers, [place] close supervision    Sit-Stand Transfers, [place] close supervision    Stand-Sit Transfers, [place] close supervision    Stand Pivot Transfers, [place] close supervision       Gait Training    [place] close supervision    Assistive Device walker, front-wheeled    Distance in Feet 110 feet    Gait Pattern Utilized step-to    Gait Deviations Identified antalgic;decreased yanet    Maintains Weight Bearing Status cues to maintain weight bearing status       LE Supine Therapeutic Exercise     Exercise(s) Performed hip abduction;SAQ (short arc quad) over bolster;quadriceps sets;gluteal sets;dorsiflexor stretch;heel slides (hip/knee flexion/extension)    Sets/Reps Detail 15 x 2    Comment assist for L SAQ       OT Clinical Impression    Daily Outcome Statement performed supine TKA TE well with assist for SAQ                    Education provided this session. See the Patient Education summary report for full details.    PT Care Plan Goals    Flowsheet Row Most Recent Value   Stair Goal, PT   PT STG: Stairs  minimum assist (75% or less patient effort)   STG Number of Stairs  8 [using both HR.]   Physical Therapy STG Date Established: Stairs  03/24/18   PT STG Duration: Stairs  5 days or less   PT LTG: Stairs  modified independence   LTG Number of Stairs  15 [using 1 HR and an SPC.]   Physical Therapy LTG Date Established: Stairs  03/24/18   PT LTG Duration: Stairs  21 days or less   Walking Locomotion Goal, PT   PT STG: Walking Locomotion  supervision required, verbal cues required   Physical Therapy STG Date Established: Walking Locomotion  03/24/18 [Distances > than or = to 175'.]   PT STG Duration: Walking Locomotion  5 days or less   PT LTG: Walking Locomotion  modified independence   Physical Therapy LTG Date Established: Walking Locomotion  03/24/18 [Distances > than or = to 300'.]   PT LTG Duration: Walking Locomotion  21 days or less   Additional Goal, PT   Physical Therapy STG: Date Established  03/24/18 [Pt. will increase B Knee PROM by > 10 degrees.]   PT STG Duration  5 days or less   Physical Therapy LTG: Date Established  03/24/18 [Pt. will increase B Knee PROM to > or = to 0-115 degrees.]   PT LTG Duration  21 days or less

## 2018-03-24 NOTE — PLAN OF CARE
Problem: Knee Arthroplasty (Total, Partial) (Adult)  Goal: Signs and Symptoms of Listed Potential Problems Will be Absent, Minimized or Managed (Knee Arthroplasty)   03/23/18 2013   Knee Arthroplasty (Total, Partial)   Problems Assessed (Knee Arthroplasty) functional deficit;pain;decreased range of motion;VTE (venous thromboembolism)

## 2018-03-24 NOTE — PLAN OF CARE
Problem: Patient Care Overview  Goal: Plan of Care Status/Review  Outcome: Ongoing (interventions implemented as appropriate)   03/24/18 1723   Patient Care Overview   IRF Plan of Care Status progress ongoing, continue   Progress, Functional Goals demonstrating adequate progress   Coping/Psychosocial   Plan Of Care Reviewed With patient

## 2018-03-24 NOTE — H&P
History & Physical    Subjective/Objective:  Admitting diagnosis: S/P total knee arthroplasty, bilateral [Z96.653]  Patient is a 62 y.o. male who presents with past medical history of asthma, depression, hyperlipidemia, hypothyroidism, proteinuria, and osteoarthritis, as well as type 2 diabetes.  Patient has a history of obesity and underwent gastric sleeve bypass surgery.  The patient failed conservative measures for his knee osteoarthritis and underwent bilateral total knee arthroplasties by Kishore Choe MD of orthopedic surgery at [facility] on March 19, 2018.  Patient tolerated surgery well.  He was allowed to weight-bear as tolerated post surgery.  Was placed on aspirin for DVT risk per orthopedic protocol.   For postoperative pain the patient was placed on oxycodone as needed.  In addition he was placed on Celebrex and Lyrica.  Depression was addressed and he was maintained on Celexa.  He was continued on Synthroid for his hypothyroidism.  For hyperlipidemia he was maintained on Vytorin.  For proteinuria the patient was continued on lisinopril.  For asthma he was maintained on Singulair.  Postoperatively the patient was treated with Ancef and then placed on Keflex empirically.  Patient requires min to mod assistance for mobility and self-care activities postsurgery/bilateral knee replacements.  He is now appropriate for acute inpatient rehabilitation to help him with functional deficits status post bilateral knee replacements.    Medical History:   Past Medical History:   Diagnosis Date   • Asthma    • Depression    • DM2 (diabetes mellitus, type 2) (CMS/HCC) ([facility])    • Hypothyroid    • Mixed hyperlipidemia    • Osteoarthritis of multiple joints    • Proteinuria        Surgical History:   Past Surgical History:   Procedure Laterality Date   • APPENDECTOMY     • GASTRIC BYPASS     • REPLACEMENT TOTAL KNEE BILATERAL Bilateral 03/19/2018   • WISDOM TOOTH EXTRACTION         Social History:   Social  History     Social History   • Marital status:      Spouse name: N/A   • Number of children: N/A   • Years of education: N/A     Social History Main Topics   • Smoking status: Never Smoker   • Smokeless tobacco: Never Used   • Alcohol use None   • Drug use: No   • Sexual activity: Not Asked     Other Topics Concern   • None     Social History Narrative   • None       Family History:   Family History   Problem Relation Age of Onset   • No Known Problems Mother    • Stroke Father        Allergies: Crestor [rosuvastatin]    [unfilled]    Review of Systems  All other systems reviewed and negative except as noted in the HPI.  Patient has bilateral knee pain post surgery.  He denies numbness tingling and pins and needles into the extremities.  Denies chest pain or shortness of breath.  Review of systems otherwise negative.    Vital Signs for the last 24 hours:  Temp:  [37.2 °C (99 °F)-37.3 °C (99.2 °F)] 37.3 °C (99.2 °F)  Heart Rate:  [74-77] 77  Resp:  [18] 18  BP: (118-121)/(58-76) 119/76      Physical Exam  General      Alert, cooperative, no distress, appears stated age.   Head:    Normocephalic, without obvious abnormality, atraumatic.   Eyes:    PERRL, conjunctiva/corneas clear, EOM's intact.        Nose:   Nares normal, septum midline, mucosa normal, no drainage or            sinus tenderness.   Throat:   Lips, mucosa, and tongue normal.    Neck:   Supple, symmetrical, trachea midline.    Back:     Symmetric, no curvature.   Lungs:     Clear to auscultation bilaterally, respirations unlabored.   Chest wall:    No tenderness or deformity.   Heart:    Regular rate and rhythm, S1 and S2 normal.   Abdomen:     Soft, non-tender, bowel sounds active all four quadrants,     no masses, no organomegaly.   Extremities:  Musculoskeletal:  1+ lower extremity edema bilaterally.   Bilateral total knee arthroplasties.      Pulses:   1+ and symmetric all extremities.   Skin:  Incisions healing well without drainage erythema  and with just mild edema.   Neurologic:          Behavior/  Emotional:  CNII-XII intact.  Alert and oriented ×3.  Motor exam week quadriceps postsurgery.  Sensory exam intact.  Reflexes stable decreased reflexes.      Appropriate, cooperative       Labs  I have reviewed the patient's pertinent labs.  Significant abnormals are Mild anemia with hemoglobin of 12.2.    Imaging  Not applicable         Assessment/Plan:  * S/P total knee arthroplasty, bilateral   Assessment & Plan    Bilateral total knee arthroplasties by Kishore Choe MD of orthopedic surgery at [facility] on March 19, 2018.  Patient tolerated surgery well.       Weight-bear as tolerated post surgery.    Complete empiric Keflex.    Apprehensive inpatient rehabilitation program to address functional deficits status post bilateral knee replacements.  Goals of modified independent to supervision with mobility self-care activities and return home.        Postoperative pain   Assessment & Plan    Oxycodone as needed for pain control.  Tylenol as needed.  Continue Celebrex.  Continue Lyrica 50 mg twice a day.  Adjust medications as needed        Depression   Assessment & Plan    Continue current dose of Celexa.  Staff support  Can consider psychology consult        Proteinuria   Assessment & Plan    Continue lisinopril.  Monitor blood pressure.        Mild intermittent asthma   Assessment & Plan    Continue Singulair.  Incentive spirometry.        DM2 (diabetes mellitus, type 2) (CMS/[facility]) ([facility])   Assessment & Plan    Monitor blood sugars.  Diabetic diet.        Hypothyroid   Assessment & Plan    Levothyroxine to continue.        Mixed hyperlipidemia   Assessment & Plan    Continue on Zocor.        Atelectasis   Assessment & Plan    Incentive spirometry.        Bilateral lower extremity edema   Assessment & Plan    Edema control with elevating legs as needed.  To consider teds.        Anemia due to blood loss   Assessment & Plan    Hemoglobin  12.2  Monitor hemoglobin            Code Status: Full Code  Estimated discharge date: 3/30/2018  Post Admission Physician Evaluation    Carl Chow is admitted to [facility] for comprehensive inpatient rehabilitation for   with functional deficits in  . Patient is receiving the following services:  .    Active medical management is required for   Patient Active Problem List   Diagnosis   • S/P total knee arthroplasty, bilateral   • Anemia due to blood loss   • Bilateral lower extremity edema   • Atelectasis   • Mixed hyperlipidemia   • Hypothyroid   • DM2 (diabetes mellitus, type 2) (CMS/[facility]) ([facility])   • Mild intermittent asthma   • Proteinuria   • Postoperative pain   • Depression       Premorbid Function       Current Function  Mobility  Gait  No Data Recorded  Stairs  No Data Recorded  Wheelchair  No Data Recorded  Transfers  No Data Recorded       Self Care  [place]: supervision  Assistive Device Use: grab bar/safety frame;urinal;raised toilet seat  [place]: independent  Cognition  Problem Solving: initiates requests;identifies causes of problems;identifies solutions to problems  Basic Problems: Solves routine problems without difficulty  Complex Problems: Solves without difficulty  Memory Deficit: immediate recall  Memory Smyth Level: Recognizes and remembers without difficulty  Social Interaction: Interacts appropriately with others  Communication  Basic Directions and Conversations: Understands without difficulty  Complex Directions and Conversations: Understands without difficulty    Risk for Complications include hypotension, DVT, infection, urinary tract infection, urine retention, falls risk.       Expected Level of Function modified independent to supervision level with mobility self-care activities.       Anticipated Discharge Plan home.       I have reviewed the pre-admission screening and there are no relevant changes.    Expected length of stay:   7-10  days.

## 2018-03-24 NOTE — PROGRESS NOTES
Patient: Carl Sessions  Location: [facility] [address]  MRN: [id number]  Today's date: 3/24/2018          Pain/Vitals - 03/24/18 0908       Pain/Comfort/Sleep    Presence of Pain complains of pain/discomfort    Preferred Pain Scale number (Numeric Rating Pain Scale)    Pain Body Location - Side Bilateral    Pain Body Location knee    Pain Rating (0-10): Rest 7    Pain Management Interventions cold applied       Vital Signs    Pulse 77    SpO2 98 %    Patient Activity At rest    /76          Prior Living Environment  Lives With: spouse  Living Arrangements: house  Home Accessibility: stairs to enter home, other (see comments)  Living Environment Comment: Pt's 2 story home has 1+1 NILE, Bed and full bath on 2nd flr., 1/2 bath on 1st,  2nd floor bath has a small built-in corner bench.  DME; RWalker, Power scooter, tub seat, reacher, long handled shoe horn, sock thomas, dressing stick, and long handled sponge.     Location, Bathroom: second floor, must negotiate stairs to access  Bathroom Access Comment:       Prior Level of Function   Dominant Hand: left          PT Evaluation - 03/24/18 0906        Session Details    Document Type initial evaluation    Mode of Treatment individual therapy;physical therapy       Time Calculation    Start Time 0900       General Information    Patient Profile Reviewed? yes       Sensory    Sensory General Assessment no sensation deficits identified       Range of Motion (ROM)    General Range of Motion lower extremity range of motion deficits identified       General LE Assessment    Lower Extremity: Range of Motion knee, left: LE ROM deficit;knee, right: LE ROM deficit       ROM: Left Knee    AROM: Extension/Flexion other (see comments)    AROM Deficit: Extension/Flexion 40-80    PROM: Extension/Flexion other (see comments)    PROM Deficit: Extension/Flexion 8-95    Comment: Extension/Flexion --       ROM: Right Knee    AROM: Extension/Flexion other (see  comments)    AROM Deficit: Extension/Flexion 20-83    PROM: Extension/Flexion other (see comments)    PROM Deficit: Extension/Flexion 6-95       Manual Muscle Testing (MMT)    General MMT Assessment lower extremity strength deficits identified       Lower Extremity    Lower Extremity: Manual Muscle Testing left hip strength deficit;right hip strength deficit;left knee strength deficit;right knee strength deficit       MMT: Left Hip    Gross Movement: Flexion (2+/5) poor plus    Gross Movement: Extension (2+/5) poor plus    Gluteus Minimus: ABduction (2+/5) poor plus       MMT: Right Hip    Gross Movement: Flexion (2+/5) poor plus    Gross Movement: Flexion ABduction & External Rotation (2+/5) poor plus    Gross Movement: Extension (2+/5) poor plus    Gross Movement: Hip ABduction (2+/5) poor plus       MMT: Left Knee    Gross Movement: Extension (2+/5) poor plus    Gross Movement: Flexion (2+/5) poor plus       MMT: Right Knee    Gross Movement: Extension (3-/5) fair minus    Gross Movement: Flexion (2+/5) poor plus       Bed Mobility/Transfers    Extremity Weight Bearing Status bilateral lower extremities       Bed Chair WC Transfer Training    Bed Mobility Assessment/Interventions bed mobility activities;rolling left;rolling right;supine to sit to supine    [place] supervision    Assistive Device (Transfers) walker, front-wheeled    Bed-Chair Transfers, [place] close supervision    Chair-Bed Transfers, [place] close supervision    Sit-Stand Transfers, [place] close supervision    Stand-Sit Transfers, [place] close supervision    Stand Pivot Transfers, [place] close supervision    Roll Left, [place] modified independence    Roll Right, [place] modified independence    Supine to Sit to Supine, [place] close supervision       Gait Analysis/Training    Gait/Stairs Locomotion Gait Training (Group);Stairs Training (Group)       Gait Training    [place] close supervision     Assistive Device walker, front-wheeled    Distance in Feet 75 feet    Gait Pattern Utilized step-to    Gait Deviations Identified antalgic;decreased yanet;decreased step length;wide base of support    Maintains Weight Bearing Status cues to maintain weight bearing status    Mode of Locomotion Walk       Stairs Training    [place] moderate assist (50% patient effort)    Stairs, Assistive Device railing    Stairs, Handrail Location both sides    Number of Stairs 4    Stair Height 6 inches    Ascending Stairs Technique step-to-step    Descending Stairs Technique step-to-step       LE Seated Therapeutic Exercise    Exercise(s) Performed hip flexion;knee flexion;LAQ (long arc quad), knee extension    Exercise Type AROM (active range of motion)    Expected Outcomes strengthen, facilitate independent active range of motion    Sets/Reps Detail 2x10    Ability to Transfer Skills beginning to transfer skills to functional activity       PT Clinical Impression    Patient's Goals For Discharge return home;take care of myself at home;return to work;return to all previous roles/activities    Plan For Care Reviewed: Physical Therapy patient feedback incorporated in PT plan for care;PT plan for care discussed with patient    System Pathology/Pathophysiology Noted musculoskeletal    Impairments Found (PT Eval) gait, locomotion, and balance;joint integrity and mobility;muscle performance;ROM (range of motion)    Disability: Inability to Perform Actions/Activities of Required Roles work    Rehab Potential/Prognosis good, to achieve stated therapy goals    Frequency of Treatment 5-7 times per week    Problem List decreased ROM;decreased strength;impaired balance;pain    Activity Limitations Related to Problem List functional mobility not performed adequately or safely for community activity    Daily Outcome Statement Pt. seen for a PT IE today.  He presents with good ROM, strength, and initial functional mobility.                    Education provided this session. See the Patient Education summary report for full details.    PT Care Plan Goals    Flowsheet Row Most Recent Value   Stair Goal, PT   PT STG: Stairs  minimum assist (75% or less patient effort)   STG Number of Stairs  8 [using both HR.]   Physical Therapy STG Date Established: Stairs  03/24/18   PT STG Duration: Stairs  5 days or less   PT LTG: Stairs  modified independence   LTG Number of Stairs  15 [using 1 HR and an SPC.]   Physical Therapy LTG Date Established: Stairs  03/24/18   PT LTG Duration: Stairs  21 days or less   Walking Locomotion Goal, PT   PT STG: Walking Locomotion  supervision required, verbal cues required   Physical Therapy STG Date Established: Walking Locomotion  03/24/18 [Distances > than or = to 175'.]   PT STG Duration: Walking Locomotion  5 days or less   PT LTG: Walking Locomotion  modified independence   Physical Therapy LTG Date Established: Walking Locomotion  03/24/18 [Distances > than or = to 300'.]   PT LTG Duration: Walking Locomotion  21 days or less   Additional Goal, PT   Physical Therapy STG: Date Established  03/24/18 [Pt. will increase B Knee PROM by > 10 degrees.]   PT STG Duration  5 days or less   Physical Therapy LTG: Date Established  03/24/18 [Pt. will increase B Knee PROM to > or = to 0-115 degrees.]   PT LTG Duration  21 days or less

## 2018-03-24 NOTE — PROGRESS NOTES
Patient: Carl Sessions  Location: [facility] [address]  MRN: [id number]  Today's date: 3/24/2018          Pain/Vitals - 03/24/18 0947       Pain/Comfort/Sleep    Presence of Pain complains of pain/discomfort    Preferred Pain Scale number (Numeric Rating Pain Scale)    Pain Body Location - Side Bilateral    Pain Body Location knee    Pain Rating (0-10): Rest 8    Pain Management Interventions Premedicated for tx.       Vital Signs    Pulse 80    SpO2 98 %    Patient Activity At rest    /74                    PT Treatment Summary - 03/24/18 0945        Session Details    Document Type daily treatment    Mode of Treatment individual therapy;physical therapy       Time Calculation    Start Time 0945    Stop Time 1000    Time Calculation (min) 15 min       LE Supine Therapeutic Exercise    Exercise(s) Performed quadriceps sets;ankle pumps    Exercise Type AROM (active range of motion);isometric contraction, static    Expected Outcomes strengthen, facilitate independent active range of motion    Sets/Reps Detail 3x10    Ability to Transfer Skills beginning to transfer skills to functional activity       PT Clinical Impression    Daily Outcome Statement Reviewed supine TE's and pt's POC with patient.  Plan to complete TKR HEP review next visit.                 Reviewed Supine TKR Te's.  Pt. Requires reinforcement.  Education provided this session. See the Patient Education summary report for full details.

## 2018-03-24 NOTE — ASSESSMENT & PLAN NOTE
Oxycodone as needed for pain control.  Tylenol as needed.  Continue Celebrex.  Continue Lyrica 50 mg twice a day.  Adjust medications as needed

## 2018-03-25 ENCOUNTER — APPOINTMENT (INPATIENT)
Dept: OCCUPATIONAL THERAPY | Facility: REHABILITATION | Age: 63
DRG: 560 | End: 2018-03-25
Payer: OTHER GOVERNMENT

## 2018-03-25 ENCOUNTER — APPOINTMENT (INPATIENT)
Dept: PHYSICAL THERAPY | Facility: REHABILITATION | Age: 63
DRG: 560 | End: 2018-03-25
Payer: OTHER GOVERNMENT

## 2018-03-25 LAB — BACTERIA UR CULT: NORMAL

## 2018-03-25 PROCEDURE — 12800000 HC ROOM AND CARE SEMIPRIVATE REHAB

## 2018-03-25 PROCEDURE — 97116 GAIT TRAINING THERAPY: CPT | Mod: GP

## 2018-03-25 PROCEDURE — 97150 GROUP THERAPEUTIC PROCEDURES: CPT | Mod: GP

## 2018-03-25 PROCEDURE — 63700000 HC SELF-ADMINISTRABLE DRUG: Performed by: HOSPITALIST

## 2018-03-25 PROCEDURE — 97110 THERAPEUTIC EXERCISES: CPT | Mod: GP

## 2018-03-25 PROCEDURE — 97535 SELF CARE MNGMENT TRAINING: CPT | Mod: GO

## 2018-03-25 PROCEDURE — 97110 THERAPEUTIC EXERCISES: CPT | Mod: GO

## 2018-03-25 RX ADMIN — SENNOSIDES AND DOCUSATE SODIUM 1 TABLET: 8.6; 5 TABLET ORAL at 20:34

## 2018-03-25 RX ADMIN — SENNOSIDES AND DOCUSATE SODIUM 1 TABLET: 8.6; 5 TABLET ORAL at 08:36

## 2018-03-25 RX ADMIN — MONTELUKAST SODIUM 10 MG: 10 TABLET, FILM COATED ORAL at 20:36

## 2018-03-25 RX ADMIN — ASPIRIN 325 MG: 325 TABLET, COATED ORAL at 12:20

## 2018-03-25 RX ADMIN — CEPHALEXIN 500 MG: 500 CAPSULE ORAL at 17:11

## 2018-03-25 RX ADMIN — LEVOTHYROXINE SODIUM 88 MCG: 88 TABLET ORAL at 06:49

## 2018-03-25 RX ADMIN — CELECOXIB 200 MG: 200 CAPSULE ORAL at 08:32

## 2018-03-25 RX ADMIN — CEPHALEXIN 500 MG: 500 CAPSULE ORAL at 08:33

## 2018-03-25 RX ADMIN — OXYCODONE HYDROCHLORIDE 5 MG: 5 TABLET ORAL at 00:10

## 2018-03-25 RX ADMIN — CEPHALEXIN 500 MG: 500 CAPSULE ORAL at 20:35

## 2018-03-25 RX ADMIN — CITALOPRAM HYDROBROMIDE 40 MG: 20 TABLET, FILM COATED ORAL at 08:33

## 2018-03-25 RX ADMIN — PREGABALIN 50 MG: 50 CAPSULE ORAL at 20:35

## 2018-03-25 RX ADMIN — OXYCODONE HYDROCHLORIDE 10 MG: 5 TABLET ORAL at 08:52

## 2018-03-25 RX ADMIN — PANTOPRAZOLE SODIUM 40 MG: 40 TABLET, DELAYED RELEASE ORAL at 08:35

## 2018-03-25 RX ADMIN — LISINOPRIL 12.5 MG: 2.5 TABLET ORAL at 08:33

## 2018-03-25 RX ADMIN — OXYCODONE HYDROCHLORIDE 5 MG: 5 TABLET ORAL at 17:45

## 2018-03-25 RX ADMIN — OXYCODONE HYDROCHLORIDE 5 MG: 5 TABLET ORAL at 12:43

## 2018-03-25 RX ADMIN — PREGABALIN 50 MG: 50 CAPSULE ORAL at 08:36

## 2018-03-25 RX ADMIN — OXYCODONE HYDROCHLORIDE 5 MG: 5 TABLET ORAL at 05:07

## 2018-03-25 RX ADMIN — OXYCODONE HYDROCHLORIDE 5 MG: 5 TABLET ORAL at 21:50

## 2018-03-25 RX ADMIN — CEPHALEXIN 500 MG: 500 CAPSULE ORAL at 12:20

## 2018-03-25 NOTE — PLAN OF CARE
Problem: Patient Care Overview  Goal: Plan of Care Status/Review  Outcome: Ongoing (interventions implemented as appropriate)   03/25/18 8282   Patient Care Overview   IRF Plan of Care Status progress ongoing, continue   Progress, Functional Goals demonstrating adequate progress   Coping/Psychosocial   Plan Of Care Reviewed With patient   Tolerated session but limited by LE pain (back to back therapy sessions).

## 2018-03-25 NOTE — PLAN OF CARE
Problem: Patient Care Overview  Goal: Plan of Care Status/Review   03/25/18 0614   Patient Care Overview   IRF Plan of Care Status progress ongoing, continue   Progress, Functional Goals demonstrating adequate progress   Coping/Psychosocial   Plan Of Care Reviewed With patient   Able to perform TKA TE protocol with verbal and tactile cues only

## 2018-03-25 NOTE — PROGRESS NOTES
Patient: Carl Sessions  Location: [facility] [address]  MRN: [id number]  Today's date: 3/25/2018       03/25/18 1001   Pain/Comfort/Sleep   Presence of Pain complains of pain/discomfort   Pain Body Location knee   Pain Rating (0-10): Rest 4   Pain Rating: Rest (8- with activity)   Vital Signs   Heart Rate 80   SpO2 95 %   Patient Activity At rest   /68   BP Location Left upper arm   BP Method Automatic   Patient Position Sitting           Prior Living Environment  Lives With: spouse  Living Arrangements: house  Home Accessibility: stairs to enter home, other (see comments)  Living Environment Comment: Pt's 2 story home has 1+1 NILE, Bed and full bath on 2nd flr., 1/2 bath on 1st,  2nd floor bath has a small built-in corner bench.  DME; RWalker, Power scooter, tub seat, reacher, long handled shoe horn, sock thomas, dressing stick, and long handled sponge.     Location, Bathroom: second floor, must negotiate stairs to access  Bathroom Access Comment:       Prior Level of Function  Ambulation: independent  Transferring: independent  Toileting: independent  Bathing: independent  Dressing: independent  Eating: independent  Communication: understands/communicates without difficulty  Swallowing: swallows foods/liquids without difficulty  Prior Functional Level Comment:  (has shower bench,RTS, RW, getting grab bars)Dominant Hand: left          PT Treatment Summary - 03/25/18 1001        Session Details    Document Type daily treatment    Mode of Treatment individual therapy;physical therapy       Time Calculation    Start Time 1000    Stop Time 1100    Time Calculation (min) 60 min       Bed Chair WC Transfer Training    Assistive Device (Transfers) walker, front-wheeled    Stand Pivot Transfers, [place] close supervision       Gait Training    [place] close supervision    Assistive Device walker, front-wheeled    Distance in Feet 91 feet    Gait Pattern Utilized step-to    Gait  "Deviations Identified antalgic;decreased yanet;decreased step length    Comment 87 + 91       Stairs Training    [place] close supervision    Stairs, Assistive Device railing    Stairs, Handrail Location both sides    Number of Stairs 4    Stair Height 6 inches    Ascending Stairs Technique step-to-step    Descending Stairs Technique step-to-step    Comment 2 trials of 4, 6\" steps with 3 min rest between trials       PT Clinical Impression    Daily Outcome Statement patient increased number of stairs to 4 x 2 this session, however, requries extra time for all activities due to pain at knees, L . R                   Education provided this session. See the Patient Education summary report for full details.    PT Care Plan Goals    Flowsheet Row Most Recent Value   Stair Goal, PT   PT STG: Stairs  minimum assist (75% or less patient effort)   STG Number of Stairs  8 [using both HR.]   Physical Therapy STG Date Established: Stairs  03/24/18   PT STG Duration: Stairs  5 days or less   PT LTG: Stairs  modified independence   LTG Number of Stairs  15 [using 1 HR and an SPC.]   Physical Therapy LTG Date Established: Stairs  03/24/18   PT LTG Duration: Stairs  21 days or less   Walking Locomotion Goal, PT   PT STG: Walking Locomotion  supervision required, verbal cues required   Physical Therapy STG Date Established: Walking Locomotion  03/24/18 [Distances > than or = to 175'.]   PT STG Duration: Walking Locomotion  5 days or less   PT LTG: Walking Locomotion  modified independence   Physical Therapy LTG Date Established: Walking Locomotion  03/24/18 [Distances > than or = to 300'.]   PT LTG Duration: Walking Locomotion  21 days or less   Additional Goal, PT   Physical Therapy STG: Date Established  03/24/18 [Pt. will increase B Knee PROM by > 10 degrees.]   PT STG Duration  5 days or less   Physical Therapy LTG: Date Established  03/24/18 [Pt. will increase B Knee PROM to > or = to 0-115 degrees.]   PT LTG Duration  " 21 days or less

## 2018-03-25 NOTE — PLAN OF CARE
Problem: Patient Care Overview  Goal: Plan of Care Status/Review  Outcome: Ongoing (interventions implemented as appropriate)   03/25/18 0614   Patient Care Overview   IRF Plan of Care Status progress ongoing, continue   Progress, Functional Goals demonstrating adequate progress   Coping/Psychosocial   Plan Of Care Reviewed With patient       Problem: Knee Arthroplasty (Total, Partial) (Adult)  Goal: Signs and Symptoms of Listed Potential Problems Will be Absent, Minimized or Managed (Knee Arthroplasty)  Outcome: Ongoing (interventions implemented as appropriate)      Problem: Functional Mobility, Impaired (Adult)  Goal: Functional Mobility Fxn [place]  Outcome: Ongoing (interventions implemented as appropriate)

## 2018-03-25 NOTE — PROGRESS NOTES
Patient: Carl Sessions  Location: [facility] [address]  MRN: [id number]  Today's date: 3/25/2018          Pain/Vitals     Row Name 03/25/18 1301          Pain/Comfort/Sleep    Presence of Pain complains of pain/discomfort     Pain Body Location knee     Pain Rating (0-10): Rest 6     Pain Rating: Rest  --        Vital Signs    Pulse 80     SpO2 97 %     Patient Activity  --     BP (!)  112/59     BP Location Left upper arm     BP Method Automatic     Patient Position Sitting           Prior Living Environment  Lives With: spouse  Living Arrangements: house  Home Accessibility: stairs to enter home, other (see comments)  Living Environment Comment: Pt's 2 story home has 1+1 NILE, Bed and full bath on 2nd flr., 1/2 bath on 1st,  2nd floor bath has a small built-in corner bench.  DME; RWalker, Power scooter, tub seat, reacher, long handled shoe horn, sock thomas, dressing stick, and long handled sponge.     Location, Bathroom: second floor, must negotiate stairs to access  Bathroom Access Comment:       Prior Level of Function  Ambulation: independent  Transferring: independent  Toileting: independent  Bathing: independent  Dressing: independent  Eating: independent  Communication: understands/communicates without difficulty  Swallowing: swallows foods/liquids without difficulty  Prior Functional Level Comment:  (has shower bench,RTS, RW, getting grab bars)Dominant Hand: left          PT Treatment Summary - 03/25/18 1301        Session Details    Document Type daily treatment    Mode of Treatment group therapy;physical therapy       Time Calculation    Start Time 1300    Stop Time 1400    Time Calculation (min) 60 min       ROM: Left Knee    PROM Deficit: Extension/Flexion B knees: 0 - 110 flex       Bed Chair WC Transfer Training    Bed Mobility Assessment/Interventions supine to sit to supine    Assistive Device (Transfers) walker, front-wheeled    Stand Pivot Transfers, [place] close  supervision    Supine to Sit to Supine, [place] close supervision       Gait Training    [place] close supervision    Assistive Device walker, front-wheeled    Distance in Feet 174 feet    Gait Pattern Utilized step-through    Gait Deviations Identified antalgic    Comment able to achieve step through gait pattern with RW support       LE Supine Therapeutic Exercise    Comment Supine TKA TE: AP stretches, quad sets/glut sets x 30 each; SAQ 10 x 2 with assist at R; HS with strap 15 x 2; hip abd x 30       PT Clinical Impression    Daily Outcome Statement Tolerated full supine TKA TE protocol with verbal and tacile cues for SAQ                   Education provided this session. See the Patient Education summary report for full details.    PT Care Plan Goals    Flowsheet Row Most Recent Value   Stair Goal, PT   PT STG: Stairs  minimum assist (75% or less patient effort)   STG Number of Stairs  8 [using both HR.]   Physical Therapy STG Date Established: Stairs  03/24/18   PT STG Duration: Stairs  5 days or less   PT LTG: Stairs  modified independence   LTG Number of Stairs  15 [using 1 HR and an SPC.]   Physical Therapy LTG Date Established: Stairs  03/24/18   PT LTG Duration: Stairs  21 days or less   Walking Locomotion Goal, PT   PT STG: Walking Locomotion  supervision required, verbal cues required   Physical Therapy STG Date Established: Walking Locomotion  03/24/18 [Distances > than or = to 175'.]   PT STG Duration: Walking Locomotion  5 days or less   PT LTG: Walking Locomotion  modified independence   Physical Therapy LTG Date Established: Walking Locomotion  03/24/18 [Distances > than or = to 300'.]   PT LTG Duration: Walking Locomotion  21 days or less   Additional Goal, PT   Physical Therapy STG: Date Established  03/24/18 [Pt. will increase B Knee PROM by > 10 degrees.]   PT STG Duration  5 days or less   Physical Therapy LTG: Date Established  03/24/18 [Pt. will increase B Knee PROM to > or = to  0-115 degrees.]   PT LTG Duration  21 days or less

## 2018-03-25 NOTE — PROGRESS NOTES
Patient: Carl Sessions  Location: [facility] [address]  MRN: [id number]  Today's date: 3/25/2018          Pain/Vitals - 03/25/18 1401        Pain/Comfort/Sleep    Presence of Pain complains of pain/discomfort    Preferred Pain Scale number (Numeric Rating Pain Scale)    Pain Body Location - Side Bilateral    Pain Body Location - Orientation lower    Pain Body Location knee    Pain Rating (0-10): Rest 7    Pain Management Interventions premedicated for activity;cold applied          Prior Living Environment  Lives With: spouse  Living Arrangements: house  Home Accessibility: stairs to enter home, other (see comments)  Living Environment Comment: Pt's 2 story home has 1+1 NILE, Bed and full bath on 2nd flr., 1/2 bath on 1st,  2nd floor bath has a small built-in corner bench.  DME; RWalker, Power scooter, tub seat, reacher, long handled shoe horn, sock thomas, dressing stick, and long handled sponge.     Location, Bathroom: second floor, must negotiate stairs to access  Bathroom Access Comment:       Prior Level of Function  Ambulation: independent  Transferring: independent  Toileting: independent  Bathing: independent  Dressing: independent  Eating: independent  Communication: understands/communicates without difficulty  Swallowing: swallows foods/liquids without difficulty  Prior Functional Level Comment:  (has shower bench,RTS, RW, getting grab bars)Dominant Hand: left          OT Treatment Summary - 03/25/18 1401        Session Details    Document Type daily treatment    Mode of Treatment individual therapy;occupational therapy       Time Calculation    Start Time 1400    Stop Time 1500    Time Calculation (min) 60 min       General Information    Patient Profile Reviewed? yes       Bed Chair WC Transfer Training    Bed Mobility Assessment/Interventions bed mobility activities;sit to supine;supine to sit to supine    [place] close supervision    Assistive Device (Transfers) walker,  front-wheeled    Bed-Chair Transfers, [place] close supervision    Chair-Bed Transfers, [place] close supervision    Sit-Stand Transfers, [place] close supervision    Stand-Sit Transfers, [place] close supervision    Stand Pivot Transfers, [place] close supervision    Supine to Sit to Supine, [place] close supervision       Gait Training    [place] close supervision    Assistive Device walker, front-wheeled    Distance in Feet 175 feet       Lower Body Dressing    Lower Body Dressing Tasks shoes/slippers;doff    Lower Body Dressing Self-Performance dons/doffs shoes    Comment supervision at EOB       UE Seated Therapeutic Exercise    Exercise(s) Performed shoulder flexion;shoulder extension;shoulder abduction;shoulder adduction;scapular depression;scapular elevation;elbow flexion;elbow extension    Device free weights, barbell    Exercise Type AROM (active range of motion);resistive exercise    Expected Outcomes strengthen normal movement patterns    Sets/Reps Detail 20x2    Ability to Transfer Skills transfers skills to functional activity most of the time                        OT Care Plan Goals    Flowsheet Row Most Recent Value   Bathing Goal, OT   OT STG: Bathing  supervision required   OT STG Date Established: Bathing  03/24/18   OT STG Duration: Bathing  3 days or less   OT LTG: Bathing  modified independence   OT LTG Date Established: Bathing  03/24/18   OT LTG Duration: Bathing  14 days or less   Grooming Goal, OT   OT STG: Grooming  independent   OT STG Date Established: Grooming  03/24/18   OT STG Duration: Grooming  5 days or less   OT LTG: Grooming  independent   OT LTG Date Established: Grooming  03/24/18   OT LTG Duration: Grooming  14 days or less   Upper Body Dressing Goal, OT   OT STG: Upper Body Dressing  supervision required   Occupational Therapy STG Date Established: UB Dressing  03/24/18   OT STG Duration: Upper Body Dressing  5 days or less   OT LTG: Upper  Body Dressing  modified independence   Occupational Therapy LTG Date Established: UB Dressing  03/24/18   OT LTG Duration: Upper Body Dressing  14 days or less   Lower Body Dressing Goal, OT   OT STG: Lower Body Dressing  supervision required   OT STG Date Established: LB Dressing  03/24/18   OT STG Duration: Lower Body Dressing  3 days or less   OT LTG: Lower Body Dressing  modified independence   OT LTG Date Established: LB Dressing  03/24/18   OT LTG Duration: Lower Body Dressing  14 days or less   Toilet Transfer Goal, OT   OT STG: Toilet Transfer  supervision required   Occupational Therapy STG Date Established: Toilet Transfer  03/24/18   OT STG Duration: Toilet Transfer  5 days or less   OT LTG: Toilet Transfer  modified independence   Occupational Therapy LTG Date Established: Toilet Transfer  03/24/18   OT LTG Duration: Toilet Transfer  14 days or less   Toileting Goal, OT   OT STG: Toileting  supervision required   OT STG Date Established: Toileting  03/24/18   OT STG Duration: Toileting  3 days or less   OT LTG: Toileting  modified independence   OT LTG Date Established: Toileting  03/24/18   OT LTG Duration: Toileting  14 days or less   Tub-Shower Transfer Goal, OT   OT STG: Tub-Shower Transfer  supervision required   OT STG Date Established: Tub Shower Transfer  03/24/18   OT STG Duration: Tub-Shower Transfer  5 days or less   OT LTG: Tub-Shower Transfer  modified independence   OT LTG Date Established: Tub Shower Transfer  03/24/18   OT LTG Duration: Tub-Shower Transfer  14 days or less

## 2018-03-26 ENCOUNTER — APPOINTMENT (INPATIENT)
Dept: PHYSICAL THERAPY | Facility: REHABILITATION | Age: 63
DRG: 560 | End: 2018-03-26
Payer: OTHER GOVERNMENT

## 2018-03-26 ENCOUNTER — APPOINTMENT (INPATIENT)
Dept: OCCUPATIONAL THERAPY | Facility: REHABILITATION | Age: 63
DRG: 560 | End: 2018-03-26
Payer: OTHER GOVERNMENT

## 2018-03-26 ENCOUNTER — APPOINTMENT (INPATIENT)
Dept: CARDIOLOGY | Facility: REHABILITATION | Age: 63
DRG: 560 | End: 2018-03-26
Attending: HOSPITALIST
Payer: OTHER GOVERNMENT

## 2018-03-26 PROBLEM — Z91.89 AT MODERATE RISK FOR DEEP VENOUS THROMBOSIS: Status: ACTIVE | Noted: 2018-03-26

## 2018-03-26 LAB
ALBUMIN SERPL-MCNC: 3.5 G/DL (ref 3.4–5)
ALP SERPL-CCNC: 118 IU/L (ref 35–126)
ALT SERPL-CCNC: 34 IU/L (ref 16–63)
ANION GAP SERPL CALC-SCNC: 4 MEQ/L (ref 3–15)
AST SERPL-CCNC: 20 IU/L (ref 15–41)
BILIRUB SERPL-MCNC: 1.5 MG/DL (ref 0.3–1.2)
BSA FOR ECHO PROCEDURE: 2.09 M2
BUN SERPL-MCNC: 20 MG/DL (ref 8–20)
CALCIUM SERPL-MCNC: 8.9 MG/DL (ref 8.9–10.3)
CHLORIDE SERPL-SCNC: 99 MMOL/L (ref 98–109)
CO2 SERPL-SCNC: 32 MMOL/L (ref 22–32)
CREAT SERPL-MCNC: 0.8 MG/DL (ref 0.8–1.3)
ERYTHROCYTE [DISTWIDTH] IN BLOOD BY AUTOMATED COUNT: 13.2 % (ref 11.6–14.4)
GFR SERPL CREATININE-BSD FRML MDRD: >60 ML/MIN/1.73M*2
GLUCOSE SERPL-MCNC: 89 MG/DL (ref 70–99)
HCT VFR BLDCO AUTO: 37.3 % (ref 40–51)
HGB BLD-MCNC: 12.5 G/DL (ref 13.7–17.5)
MCH RBC QN AUTO: 29.7 PG (ref 28–33.2)
MCHC RBC AUTO-ENTMCNC: 33.5 G/DL (ref 32.2–36.5)
MCV RBC AUTO: 88.6 FL (ref 83–98)
PDW BLD AUTO: 8.4 FL (ref 9.4–12.4)
PLATELET # BLD AUTO: 267 K/UL (ref 150–350)
POTASSIUM SERPL-SCNC: 4.3 MMOL/L (ref 3.6–5.1)
PROT SERPL-MCNC: 6.5 G/DL (ref 6–8.2)
RBC # BLD AUTO: 4.21 M/UL (ref 4.5–5.8)
SODIUM SERPL-SCNC: 135 MMOL/L (ref 136–144)
WBC # BLD AUTO: 7.45 K/UL (ref 3.8–10.5)

## 2018-03-26 PROCEDURE — 97530 THERAPEUTIC ACTIVITIES: CPT | Mod: GP | Performed by: PHYSICAL THERAPIST

## 2018-03-26 PROCEDURE — 63700000 HC SELF-ADMINISTRABLE DRUG: Performed by: HOSPITALIST

## 2018-03-26 PROCEDURE — 85027 COMPLETE CBC AUTOMATED: CPT | Performed by: HOSPITALIST

## 2018-03-26 PROCEDURE — 97530 THERAPEUTIC ACTIVITIES: CPT | Mod: GP

## 2018-03-26 PROCEDURE — 90791 PSYCH DIAGNOSTIC EVALUATION: CPT | Performed by: PSYCHOLOGIST

## 2018-03-26 PROCEDURE — 97116 GAIT TRAINING THERAPY: CPT | Mod: GP | Performed by: PHYSICAL THERAPIST

## 2018-03-26 PROCEDURE — 97116 GAIT TRAINING THERAPY: CPT | Mod: GP

## 2018-03-26 PROCEDURE — 93970 EXTREMITY STUDY: CPT | Mod: 50

## 2018-03-26 PROCEDURE — 36415 COLL VENOUS BLD VENIPUNCTURE: CPT | Performed by: HOSPITALIST

## 2018-03-26 PROCEDURE — 97530 THERAPEUTIC ACTIVITIES: CPT | Mod: GO

## 2018-03-26 PROCEDURE — 97150 GROUP THERAPEUTIC PROCEDURES: CPT | Mod: GP

## 2018-03-26 PROCEDURE — 12800000 HC ROOM AND CARE SEMIPRIVATE REHAB

## 2018-03-26 PROCEDURE — 80053 COMPREHEN METABOLIC PANEL: CPT | Performed by: HOSPITALIST

## 2018-03-26 PROCEDURE — 63700000 HC SELF-ADMINISTRABLE DRUG: Performed by: PHYSICAL MEDICINE & REHABILITATION

## 2018-03-26 PROCEDURE — 97110 THERAPEUTIC EXERCISES: CPT | Mod: GO

## 2018-03-26 RX ORDER — LIDOCAINE 560 MG/1
2 PATCH PERCUTANEOUS; TOPICAL; TRANSDERMAL DAILY
Status: DISCONTINUED | OUTPATIENT
Start: 2018-03-26 | End: 2018-03-30 | Stop reason: HOSPADM

## 2018-03-26 RX ADMIN — LIDOCAINE 2 PATCH: 246 PATCH TOPICAL at 08:52

## 2018-03-26 RX ADMIN — MONTELUKAST SODIUM 10 MG: 10 TABLET, FILM COATED ORAL at 22:08

## 2018-03-26 RX ADMIN — PANTOPRAZOLE SODIUM 40 MG: 40 TABLET, DELAYED RELEASE ORAL at 08:27

## 2018-03-26 RX ADMIN — OXYCODONE HYDROCHLORIDE 5 MG: 5 TABLET ORAL at 12:30

## 2018-03-26 RX ADMIN — ASPIRIN 325 MG: 325 TABLET, COATED ORAL at 08:34

## 2018-03-26 RX ADMIN — CEPHALEXIN 500 MG: 500 CAPSULE ORAL at 17:51

## 2018-03-26 RX ADMIN — OXYCODONE HYDROCHLORIDE 5 MG: 5 TABLET ORAL at 03:03

## 2018-03-26 RX ADMIN — SENNOSIDES AND DOCUSATE SODIUM 1 TABLET: 8.6; 5 TABLET ORAL at 08:34

## 2018-03-26 RX ADMIN — OXYCODONE HYDROCHLORIDE 10 MG: 5 TABLET ORAL at 07:45

## 2018-03-26 RX ADMIN — CELECOXIB 200 MG: 200 CAPSULE ORAL at 08:35

## 2018-03-26 RX ADMIN — PREGABALIN 50 MG: 50 CAPSULE ORAL at 08:35

## 2018-03-26 RX ADMIN — CEPHALEXIN 500 MG: 500 CAPSULE ORAL at 22:06

## 2018-03-26 RX ADMIN — LEVOTHYROXINE SODIUM 88 MCG: 88 TABLET ORAL at 06:14

## 2018-03-26 RX ADMIN — LISINOPRIL 1.25 MG: 2.5 TABLET ORAL at 08:38

## 2018-03-26 RX ADMIN — EZETIMIBE 10 MG: 10 TABLET ORAL at 22:06

## 2018-03-26 RX ADMIN — ACETAMINOPHEN 650 MG: 325 TABLET, FILM COATED ORAL at 08:35

## 2018-03-26 RX ADMIN — PREGABALIN 50 MG: 50 CAPSULE ORAL at 22:06

## 2018-03-26 RX ADMIN — OXYCODONE HYDROCHLORIDE 5 MG: 5 TABLET ORAL at 03:36

## 2018-03-26 RX ADMIN — CITALOPRAM HYDROBROMIDE 40 MG: 20 TABLET, FILM COATED ORAL at 08:36

## 2018-03-26 RX ADMIN — SENNOSIDES AND DOCUSATE SODIUM 1 TABLET: 8.6; 5 TABLET ORAL at 22:05

## 2018-03-26 RX ADMIN — OXYCODONE HYDROCHLORIDE 5 MG: 5 TABLET ORAL at 18:01

## 2018-03-26 RX ADMIN — CEPHALEXIN 500 MG: 500 CAPSULE ORAL at 12:30

## 2018-03-26 RX ADMIN — CEPHALEXIN 500 MG: 500 CAPSULE ORAL at 08:35

## 2018-03-26 RX ADMIN — SIMVASTATIN 80 MG: 40 TABLET, FILM COATED ORAL at 22:07

## 2018-03-26 ASSESSMENT — COGNITIVE AND FUNCTIONAL STATUS - GENERAL
APPEARANCE: WELL GROOMED
DELUSIONS: NONE OR AGE APPROPRIATE
THOUGHT_PROCESS: WNL
INSIGHT: INTACT
EYE_CONTACT: WNL
APPETITE: NO CHANGE
PERCEPTUAL FUNCTION: NORMAL
PSYCHOMOTOR FUNCTIONING: WNL
SLEEP_WAKE_CYCLE: DECREASED
MOOD: FRUSTRATED
ATTENTION: WNL
EST. PREMORBID INTELLIGENCE: AVERAGE
SPEECH: REGULAR
RECENT MEMORY: WNL
IMPULSE CONTROL: INTACT
REMOTE MEMORY: WNL
AFFECT: FULL RANGE
CONCENTRATION: WNL
THOUGHT_CONTENT: APPROPRIATE
AROUSAL LEVEL: ALERT
ORIENTATION: FULLY ORIENTED

## 2018-03-26 NOTE — PLAN OF CARE
Problem: Occupational Therapy Goals (BADL Skill Impairment)  Goal: Bathing Goals  Outcome: Ongoing (interventions implemented as appropriate)    Goal: Dressing Goals, Lower Body  Outcome: Ongoing (interventions implemented as appropriate)    Goal: Dressing Goals, Upper Body  Outcome: Ongoing (interventions implemented as appropriate)    Goal: CPM S16 GIRF GROOMING GOALS.GIRF GROOMING UB  Outcome: Ongoing (interventions implemented as appropriate)    Goal: Toileting Goals  Outcome: Ongoing (interventions implemented as appropriate)      Problem: Occupational Therapy Goals (Impaired Functional Mobility)  Goal: Toilet Transfer Goals, OT  Outcome: Ongoing (interventions implemented as appropriate)   03/24/18 1333 03/26/18 1645   Toilet Transfer Goal, OT   OT STG: Toilet Transfer --  modified independence   Occupational Therapy STG Date Established: Toilet Transfer 03/24/18 --    OT STG Duration: Toilet Transfer --  7 days or less   Occupational Therapy STG Date Reviewed: Toilet Transfer --  03/26/18   OT STG Outcome: Toilet Transfer --  goal revised this date;goal ongoing   OT LTG: Toilet Transfer modified independence --    Occupational Therapy LTG Date Established: Toilet Transfer 03/24/18 --    OT LTG Duration: Toilet Transfer 14 days or less --    Occupational Therapy LTG Date Reviewed: Toilet Transfer --  03/26/18   OT LTG Outcome: Toilet Transfer --  goal revised this date;goal ongoing     Goal: Tub-Shower Transfer Goals, OT  Outcome: Ongoing (interventions implemented as appropriate)

## 2018-03-26 NOTE — ASSESSMENT & PLAN NOTE
Patient had Doppler ultrasounds of the lower extremities this morning and has a left posterior tibial DVT.  Continue with aspirin per orthopedic protocol.  Recheck Dopplers later in the week to monitor for progression.

## 2018-03-26 NOTE — SUBJECTIVE & OBJECTIVE
Patient was seen and examined at the bedside after lunch   Attestation Notes: Face to face encounter completed    Subjective     Interval History: none.. there were no overnight issues reported.   History also provided by: nursing  Patient had lower extremity Dopplers done this morning preliminary report revealed a distal DVT in the left lower extremity  Patient denies any changes in his pain in the lower extremities, states his pain has been controlled with his current pain regimen  Denies any issues sleeping overnight  States he is now moving his bowels without difficulty  Reports a good appetite  No headaches or dizziness  No chest pain shortness of breath or cough  No abdominal pain, no nausea or vomiting  No dysuria urgency or frequency  Feels he is progressing well in therapy    Objective     Vital signs in last 24 hours:  Heart Rate:  [78-84] 78  BP: (108-135)/(60-71) 135/71    No intake or output data in the 24 hours ending 03/26/18 1536  Intake/Output this shift:  No intake/output data recorded.    Review of Systems:  Pertinent items are noted in HPI.    Labs  Lab Results   Component Value Date    WBC 7.45 03/26/2018    HGB 12.5 (L) 03/26/2018    HCT 37.3 (L) 03/26/2018    MCV 88.6 03/26/2018     03/26/2018     Lab Results   Component Value Date    GLUCOSE 89 03/26/2018    CALCIUM 8.9 03/26/2018     (L) 03/26/2018    K 4.3 03/26/2018    CO2 32 03/26/2018    CL 99 03/26/2018    BUN 20 03/26/2018    CREATININE 0.8 03/26/2018     Lab Results   Component Value Date    ALT 34 03/26/2018    AST 20 03/26/2018    ALKPHOS 118 03/26/2018    BILITOT 1.5 (H) 03/26/2018         Imaging  Le: dopplers done this AM- prelim read is + for a distabl DVT in the LLE     Current Facility-Administered Medications   Medication Dose Route Frequency   • acetaminophen  650 mg oral q4h PRN   • albuterol  2.5 mg nebulization q6h PRN   • alum-mag hydroxide-simeth  30 mL oral q6h PRN   • aspirin  325 mg oral Daily   •  bisacodyl  10 mg rectal Daily PRN   • celecoxib  200 mg oral Daily   • cephalexin  500 mg oral QID   • citalopram  40 mg oral Daily   • glucose  16-32 g of dextrose oral PRN    Or   • dextrose  15-30 g of dextrose oral PRN    Or   • glucagon  1 mg intramuscular PRN    Or   • dextrose in water  25 mL intravenous PRN   • ezetimibe  10 mg oral Nightly    And   • simvastatin  80 mg oral Nightly   • levothyroxine  88 mcg oral Daily (6:30a)   • lidocaine  2 patch Topical Daily   • lisinopril  1.25 mg oral Daily   • magnesium hydroxide  30 mL oral 2x daily PRN   • montelukast  10 mg oral Nightly   • ondansetron ODT  4 mg oral q8h PRN   • oxyCODONE  5-10 mg oral q4h PRN   • pantoprazole  40 mg oral Daily before breakfast   • polyethylene glycol  17 g oral Daily PRN   • pregabalin  50 mg oral BID   • sennosides-docusate sodium  1 tablet oral BID         VTE Assessment: ASA     Full Code    Physical Exam  General: NAD, resting comfortably in bed at the time of my exam  HEENT: PERRLA, EOMI, moist mucous membranes  Neck: Supple, no lymphadenopathy  Heart: Positive S1, S2, regular, no murmurs heard  Lungs: Clear to auscultation bilaterally, no wheezes rales or rhonchi heard  Abdomen: Soft, nontender, nondistended, positive bowel sounds  Extremities: Bilateral knee incisions are healing well no signs of active infection at this time, minimal edema in the lower extremities bilaterally no pain on my exam  Neuro: Awake and alert, oriented ×3, following commands appropriately, moves all 4 extremities      Plan of care was discussed with patient, nurse    62-year-old male with past medical history of asthma, depression, DM, hypothyroidism, hyperlipidemia, proteinuria and OA/DJD presents for comprehensive rehab therapy after bilateral TKA.

## 2018-03-26 NOTE — ASSESSMENT & PLAN NOTE
Lower extremity Doppler done today preliminary read reveals a clot in the left lower extremity  Awaiting final read from radiology  As this is likely a distal clot per the prelim read okay to monitor and recheck Dopplers at the end of the week  Continue aspirin 325 mg daily  He has no shortness of breath or chest pain

## 2018-03-26 NOTE — PROGRESS NOTES
Daily Progress Note    Patient was seen and examined at the bedside after lunch   Attestation Notes: Face to face encounter completed    Subjective     Interval History: none.. there were no overnight issues reported.   History also provided by: nursing  Patient had lower extremity Dopplers done this morning preliminary report revealed a distal DVT in the left lower extremity  Patient denies any changes in his pain in the lower extremities, states his pain has been controlled with his current pain regimen  Denies any issues sleeping overnight  States he is now moving his bowels without difficulty  Reports a good appetite  No headaches or dizziness  No chest pain shortness of breath or cough  No abdominal pain, no nausea or vomiting  No dysuria urgency or frequency  Feels he is progressing well in therapy    Objective     Vital signs in last 24 hours:  Heart Rate:  [78-84] 78  BP: (108-135)/(60-71) 135/71    No intake or output data in the 24 hours ending 03/26/18 1536  Intake/Output this shift:  No intake/output data recorded.    Review of Systems:  Pertinent items are noted in HPI.    Labs  Lab Results   Component Value Date    WBC 7.45 03/26/2018    HGB 12.5 (L) 03/26/2018    HCT 37.3 (L) 03/26/2018    MCV 88.6 03/26/2018     03/26/2018     Lab Results   Component Value Date    GLUCOSE 89 03/26/2018    CALCIUM 8.9 03/26/2018     (L) 03/26/2018    K 4.3 03/26/2018    CO2 32 03/26/2018    CL 99 03/26/2018    BUN 20 03/26/2018    CREATININE 0.8 03/26/2018     Lab Results   Component Value Date    ALT 34 03/26/2018    AST 20 03/26/2018    ALKPHOS 118 03/26/2018    BILITOT 1.5 (H) 03/26/2018         Imaging  Le: dopplers done this AM- prelim read is + for a distabl DVT in the LLE     Current Facility-Administered Medications   Medication Dose Route Frequency   • acetaminophen  650 mg oral q4h PRN   • albuterol  2.5 mg nebulization q6h PRN   • alum-mag hydroxide-simeth  30 mL oral q6h PRN   • aspirin  325  mg oral Daily   • bisacodyl  10 mg rectal Daily PRN   • celecoxib  200 mg oral Daily   • cephalexin  500 mg oral QID   • citalopram  40 mg oral Daily   • glucose  16-32 g of dextrose oral PRN    Or   • dextrose  15-30 g of dextrose oral PRN    Or   • glucagon  1 mg intramuscular PRN    Or   • dextrose in water  25 mL intravenous PRN   • ezetimibe  10 mg oral Nightly    And   • simvastatin  80 mg oral Nightly   • levothyroxine  88 mcg oral Daily (6:30a)   • lidocaine  2 patch Topical Daily   • lisinopril  1.25 mg oral Daily   • magnesium hydroxide  30 mL oral 2x daily PRN   • montelukast  10 mg oral Nightly   • ondansetron ODT  4 mg oral q8h PRN   • oxyCODONE  5-10 mg oral q4h PRN   • pantoprazole  40 mg oral Daily before breakfast   • polyethylene glycol  17 g oral Daily PRN   • pregabalin  50 mg oral BID   • sennosides-docusate sodium  1 tablet oral BID         VTE Assessment: ASA     Full Code    Physical Exam  General: NAD, resting comfortably in bed at the time of my exam  HEENT: PERRLA, EOMI, moist mucous membranes  Neck: Supple, no lymphadenopathy  Heart: Positive S1, S2, regular, no murmurs heard  Lungs: Clear to auscultation bilaterally, no wheezes rales or rhonchi heard  Abdomen: Soft, nontender, nondistended, positive bowel sounds  Extremities: Bilateral knee incisions are healing well no signs of active infection at this time, minimal edema in the lower extremities bilaterally no pain on my exam  Neuro: Awake and alert, oriented ×3, following commands appropriately, moves all 4 extremities      Plan of care was discussed with patient, nurse    62-year-old male with past medical history of asthma, depression, DM, hypothyroidism, hyperlipidemia, proteinuria and OA/DJD presents for comprehensive rehab therapy after bilateral TKA.    Assessment & Plan  * S/P total knee arthroplasty, bilateral   Assessment & Plan    Done by Dr. Neff at [facility] on 3/19/2018  Continue to work with PT/OT        At  moderate risk for deep venous thrombosis   Assessment & Plan    Per preliminary read today he does have a DVT in the left lower extremity  Await final read        Depression   Assessment & Plan    Stable on Celexa daily        Postoperative pain   Assessment & Plan    Controlled with Celebrex, Lyrica, oxycodone, Tylenol        Proteinuria   Assessment & Plan    On a small dose of lisinopril 1.25 mg        Mild intermittent asthma   Assessment & Plan    Stable at this time nebs ordered as needed        DM2 (diabetes mellitus, type 2) (CMS/[facility]) ([facility])   Assessment & Plan    Sugars are currently well controlled  He is not on any medications for diabetes at this time        Hypothyroid   Assessment & Plan    Continue Synthroid daily        Mixed hyperlipidemia   Assessment & Plan    Worried that he is not getting his Vytorin here, explained that he is getting both setting on simvastatin they have just been given separately  Continue Zetia 10 mg and simvastatin 80 mg daily        Bilateral lower extremity edema   Assessment & Plan    Lower extremity Doppler done today preliminary read reveals a clot in the left lower extremity  Awaiting final read from radiology  As this is likely a distal clot per the prelim read okay to monitor and recheck Dopplers at the end of the week  Continue aspirin 325 mg daily  He has no shortness of breath or chest pain        Anemia due to blood loss   Assessment & Plan    Monitor hemoglobin at this time  Has remained stable around 12            Expected Discharge Date:  3/30/2018

## 2018-03-26 NOTE — ASSESSMENT & PLAN NOTE
Oxycodone as needed for pain control.  Tylenol as needed.  Continue Celebrex.  Continue Lyrica 50 mg twice a day.  Add lidocaine patches.

## 2018-03-26 NOTE — PROGRESS NOTES
Inpatient Psychology Progress Note    3/26/2018  Duration: 30 minutes   Carl Sessions : 1955, a 62 y.o. male, for follow up visit.  Reason:  He has been experiencing pain and adjustment issues.  HPI: B TKA     Current Evaluation:     Mental Status Evaluation:  Arousal Level: Alert  Appearance: Well Groomed  Speech: Regular  Psychomotor Functioning: WNL  Eye Contact: WNL  Est. Premorbid Intelligence: Average  Orientation: Fully oriented  Attention: WNL  Concentration: WNL  Recent Memory: WNL  Remote Memory: WNL  Thought Content: Appropriate  Thought Process: WNL  Insight: Intact  Perceptual Function: Normal  Delusions: None or age appropriate  Sleeping: Decreased  Appetite: No Change  Affect: Full Range  Mood: Frustrated    Comments:      Additional Assessments done this visit:       Session Summary:        Interventions  Acceptance & Adjustment  Monitoring of Symptoms  Pain Management    Psychoeducation provided on:  Orthopedic Recovery     Recommendations:   Individual Therapy   30 minutes 1 times weekly    Goals     • Maximize adjustment and acceptance of limitations           Visit Diagnosis:   F34.1    Diagnostic Impression:   Weekly Progress Summary: progressing toward goals as expected  Weekly Outcome Statement: 63 yo male admitted c B TKR.  Pleased c progress. Frustrated by pain med mgmt today;  unable to get meds to coincide c tx times. Highly motivated for tx; looks forward to being very active in outdoor sports. states wife does not handle change  (such as prospect of grab bars) easily. Wife stressed by issues involving her parents at this time. Pt has h/o depression and is on Celexa, which he feels keeps his mood stable. Cherry.    Psychological condition is generally improving       Jesika Skaggs, PhD @ 3:29 PM

## 2018-03-26 NOTE — PROGRESS NOTES
Patient: Carl Sessions  Location: [facility] [address]  MRN: [id number]  Today's date: 3/26/2018          Pain/Vitals - 03/26/18 1530        Pain/Comfort/Sleep    Presence of Pain complains of pain/discomfort    Pain Rating (0-10): Activity 4    Pain Management Interventions premedicated for activity          Prior Living Environment  Lives With: spouse  Living Arrangements: house  Home Accessibility: stairs to enter home, other (see comments)  Living Environment Comment: Pt's 2 story home has 1+1 NILE, Bed and full bath on 2nd flr., 1/2 bath on 1st,  2nd floor bath has a small built-in corner bench.  DME; RWalker, Power scooter, tub seat, reacher, long handled shoe horn, sock thomas, dressing stick, and long handled sponge.     Location, Bathroom: second floor, must negotiate stairs to access  Bathroom Access Comment: owns RTS + rails, will have shower chair + back delivered from [facility], owns grab bars in shower stall on 2nd floor     Prior Level of Function  Ambulation: independent  Transferring: independent  Toileting: independent  Bathing: independent  Dressing: independent  Eating: independent  Communication: understands/communicates without difficulty  Swallowing: swallows foods/liquids without difficulty  Prior Functional Level Comment:  (has shower bench,RTS, RW, getting grab bars)Dominant Hand: left          PT Treatment Summary - 03/26/18 1530        Session Details    Document Type daily treatment    Mode of Treatment physical therapy;individual therapy       Time Calculation    Start Time 1530    Stop Time 1600    Time Calculation (min) 30 min       Bed Chair WC Transfer Training    Assistive Device (Transfers) walker, front-wheeled    Sit-Stand Transfers, [place] close supervision;verbal cues    Verbal Cues (Sit-Stand Transfers) safety;technique    Stand-Sit Transfers, [place] close supervision;verbal cues    Verbal Cues (Stand-Sit Transfers) safety;technique    Stand Pivot  Transfers, [place] close supervision;verbal cues    Verbal Cues (Stand Pivot/Stand Step Transfers) safety;technique       Gait Training    [place] close supervision    Assistive Device walker, front-wheeled    Distance in Feet 150 feet    Gait Pattern Utilized step-through    Gait Deviations Identified antalgic    Comment 150 feet x 2       Stairs Training    [place] close supervision    Stairs, Assistive Device railing    Stairs, Handrail Location both sides    Number of Stairs 12    Stair Height 6 inches    Ascending Stairs Technique step-over-step    Descending Stairs Technique step-over-step       PT Clinical Impression    Daily Outcome Statement Pt progressed to reciprocal pattern up and down steps w/o increased in hands on assistance required.                        PT Care Plan Goals    Flowsheet Row Most Recent Value   Stair Goal, PT   PT STG: Stairs  minimum assist (75% or less patient effort)   STG Number of Stairs  8 [using both HR.]   Physical Therapy STG Date Established: Stairs  03/24/18   PT STG Duration: Stairs  5 days or less   PT LTG: Stairs  modified independence   LTG Number of Stairs  15 [using 1 HR and an SPC.]   Physical Therapy LTG Date Established: Stairs  03/24/18   PT LTG Duration: Stairs  21 days or less   Walking Locomotion Goal, PT   PT STG: Walking Locomotion  supervision required, verbal cues required   Physical Therapy STG Date Established: Walking Locomotion  03/24/18 [Distances > than or = to 175'.]   PT STG Duration: Walking Locomotion  5 days or less   PT LTG: Walking Locomotion  modified independence   Physical Therapy LTG Date Established: Walking Locomotion  03/24/18 [Distances > than or = to 300'.]   PT LTG Duration: Walking Locomotion  21 days or less   Additional Goal, PT   Physical Therapy STG: Date Established  03/24/18 [Pt. will increase B Knee PROM by > 10 degrees.]   PT STG Duration  5 days or less   Physical Therapy LTG: Date Established  03/24/18 [Pt.  will increase B Knee PROM to > or = to 0-115 degrees.]   PT LTG Duration  21 days or less

## 2018-03-26 NOTE — ASSESSMENT & PLAN NOTE
Worried that he is not getting his Vytorin here, explained that he is getting both setting on simvastatin they have just been given separately  Continue Zetia 10 mg and simvastatin 80 mg daily

## 2018-03-26 NOTE — SUBJECTIVE & OBJECTIVE
"   Patient was seen and examined.   Attestation Notes: Face to face encounter completed    Subjective    Patient is tolerating therapies.  Progressing with mobility and self-care activities.  Pain is under fairly good control.  Objective     BP (!) 112/59 (BP Location: Left upper arm, Patient Position: Sitting)   Pulse 80   Temp 36.2 °C (97.2 °F) (Oral)   Resp 18   Ht 1.676 m (5' 6\")   Wt 94.1 kg (207 lb 6.4 oz)   SpO2 97%   BMI 33.48 kg/m²     No intake or output data in the 24 hours ending 03/11/18 0953  Intake/Output this shift:  No intake/output data recorded.    Review of Systems:  Pertinent items are noted in HPI.  Denies chest pain and shortness of breath.  Review of systems otherwise negative.    Labs     Results from last 7 days  Lab Units 03/26/18  0619   WBC K/uL 7.45   HEMOGLOBIN g/dL 12.5*   HEMATOCRIT % 37.3*   PLATELETS K/uL 267       Results from last 7 days  Lab Units 03/26/18  0619   SODIUM mmol/L 135*   POTASSIUM mmol/L 4.3   CHLORIDE mmol/L 99   CO2 mmol/L 32   BUN mg/dL 20   CREATININE mg/dL 0.8   CALCIUM mg/dL 8.9   ALBUMIN g/dL 3.5   BILIRUBIN TOTAL mg/dL 1.5*   ALK PHOS IU/L 118   ALT IU/L 34   AST IU/L 20   GLUCOSE mg/dL 89         Imaging  Not applicable    Full Code    Physical Exam  General      Alert, cooperative, no distress, appears stated age.   Head:    Normocephalic, without obvious abnormality, atraumatic.   Eyes:    PERRL, conjunctiva/corneas clear, EOM's intact.        Nose:   Nares normal, septum midline, mucosa normal, no drainage or            sinus tenderness.   Throat:   Lips, mucosa, and tongue normal.    Neck:   Supple, symmetrical, trachea midline.    Back:     Symmetric, no curvature.   Lungs:     Clear to auscultation bilaterally, respirations unlabored.   Chest wall:    No tenderness or deformity.   Heart:    Regular rate and rhythm, S1 and S2 normal.   Abdomen:     Soft, non-tender, bowel sounds active all four quadrants,     no masses, no organomegaly. "   Extremities:  Musculoskeletal:  1+ lower extremity edema bilaterally.   Bilateral total knee arthroplasties.      Pulses:   1+ and symmetric all extremities.   Skin:  Incisions healing very well, no drainage or erythema.   Neurologic:          Behavior/  Emotional:  CNII-XII intact.  Alert and oriented ×3.  Motor exam improving quad strength bilaterally.  Sensory exam intact.  Reflexes stable decreased reflexes.      Appropriate, cooperative           Plan of care was discussed with patient

## 2018-03-26 NOTE — PLAN OF CARE
Problem: Patient Care Overview  Goal: Plan of Care Status/Review  Outcome: Ongoing (interventions implemented as appropriate)   03/26/18 1519   Patient Care Overview   IRF Plan of Care Status progress ongoing, continue   Progress, Functional Goals demonstrating adequate progress   Coping/Psychosocial   Plan Of Care Reviewed With patient   Good tolerance of supine exercises. Pt progressing well with B knee ROM.

## 2018-03-26 NOTE — PROGRESS NOTES
"Daily Progress Note       Patient was seen and examined.   Attestation Notes: Face to face encounter completed    Subjective    Patient is tolerating therapies.  Progressing with mobility and self-care activities.  Pain is under fairly good control.  Objective     BP (!) 112/59 (BP Location: Left upper arm, Patient Position: Sitting)   Pulse 80   Temp 36.2 °C (97.2 °F) (Oral)   Resp 18   Ht 1.676 m (5' 6\")   Wt 94.1 kg (207 lb 6.4 oz)   SpO2 97%   BMI 33.48 kg/m²      No intake or output data in the 24 hours ending 03/11/18 0953  Intake/Output this shift:  No intake/output data recorded.    Review of Systems:  Pertinent items are noted in HPI.  Denies chest pain and shortness of breath.  Review of systems otherwise negative.    Labs     Results from last 7 days  Lab Units 03/26/18  0619   WBC K/uL 7.45   HEMOGLOBIN g/dL 12.5*   HEMATOCRIT % 37.3*   PLATELETS K/uL 267       Results from last 7 days  Lab Units 03/26/18  0619   SODIUM mmol/L 135*   POTASSIUM mmol/L 4.3   CHLORIDE mmol/L 99   CO2 mmol/L 32   BUN mg/dL 20   CREATININE mg/dL 0.8   CALCIUM mg/dL 8.9   ALBUMIN g/dL 3.5   BILIRUBIN TOTAL mg/dL 1.5*   ALK PHOS IU/L 118   ALT IU/L 34   AST IU/L 20   GLUCOSE mg/dL 89         Imaging  Not applicable    Full Code    Physical Exam  General      Alert, cooperative, no distress, appears stated age.   Head:    Normocephalic, without obvious abnormality, atraumatic.   Eyes:    PERRL, conjunctiva/corneas clear, EOM's intact.        Nose:   Nares normal, septum midline, mucosa normal, no drainage or            sinus tenderness.   Throat:   Lips, mucosa, and tongue normal.    Neck:   Supple, symmetrical, trachea midline.    Back:     Symmetric, no curvature.   Lungs:     Clear to auscultation bilaterally, respirations unlabored.   Chest wall:    No tenderness or deformity.   Heart:    Regular rate and rhythm, S1 and S2 normal.   Abdomen:     Soft, non-tender, bowel sounds active all four quadrants,     no masses, " no organomegaly.   Extremities:  Musculoskeletal:  1+ lower extremity edema bilaterally.   Bilateral total knee arthroplasties.      Pulses:   1+ and symmetric all extremities.   Skin:  Incisions healing very well, no drainage or erythema.   Neurologic:          Behavior/  Emotional:  CNII-XII intact.  Alert and oriented ×3.  Motor exam improving quad strength bilaterally.  Sensory exam intact.  Reflexes stable decreased reflexes.      Appropriate, cooperative           Plan of care was discussed with patient    Assessment & Plan  * S/P total knee arthroplasty, bilateral   Assessment & Plan    Bilateral total knee arthroplasties by Kishore Choe MD of orthopedic surgery at [facility] on March 19, 2018.  Patient tolerated surgery well.       Weight-bear as tolerated post surgery.    Complete empiric Keflex.    Apprehensive inpatient rehabilitation program to address functional deficits status post bilateral knee replacements.  Goals of modified independent to supervision with mobility self-care activities and return home.    The patient is tolerating and improving with therapies well.  Is improving with mobility and self-care activities.        Postoperative pain   Assessment & Plan    Oxycodone as needed for pain control.  Tylenol as needed.  Continue Celebrex.  Continue Lyrica 50 mg twice a day.  Add lidocaine patches.        At moderate risk for deep venous thrombosis   Assessment & Plan    Patient had Doppler ultrasounds of the lower extremities this morning and has a left posterior tibial DVT.  Continue with aspirin per orthopedic protocol.  Recheck Dopplers later in the week to monitor for progression.        Depression   Assessment & Plan    Continue current dose of Celexa.  Staff support  Can consider psychology consult        Proteinuria   Assessment & Plan    Continue lisinopril.  Monitor blood pressure.        Mild intermittent asthma   Assessment & Plan    Continue Singulair.  Incentive  spirometry.        DM2 (diabetes mellitus, type 2) (CMS/HCC) (Formerly [facility])   Assessment & Plan    Monitor blood sugars.  Diabetic diet.        Hypothyroid   Assessment & Plan    Levothyroxine to continue.        Mixed hyperlipidemia   Assessment & Plan    Continue on Zocor.        Atelectasis   Assessment & Plan    Incentive spirometry.        Bilateral lower extremity edema   Assessment & Plan    Edema control with elevating legs as needed.          Anemia due to blood loss   Assessment & Plan    Hemoglobin stable at 12.5            Expected Discharge Date:  3/30/2018

## 2018-03-26 NOTE — PROGRESS NOTES
Patient: Carl Sessions  Location: [facility] [address]  MRN: [id number]  Today's date: 3/26/2018          Pain/Vitals - 03/26/18 1300        Pain/Comfort/Sleep    Presence of Pain complains of pain/discomfort    Preferred Pain Scale number (Numeric Rating Pain Scale)    Pain Body Location - Side Bilateral    Pain Rating (0-10): Rest 6    Pain Management Interventions premedicated for activity       Vital Signs    Pulse 78    /71          Prior Living Environment  Lives With: spouse  Living Arrangements: house  Home Accessibility: stairs to enter home, other (see comments)  Living Environment Comment: Pt's 2 story home has 1+1 NILE, Bed and full bath on 2nd flr., 1/2 bath on 1st,  2nd floor bath has a small built-in corner bench.  DME; RWalker, Power scooter, tub seat, reacher, long handled shoe horn, sock thomas, dressing stick, and long handled sponge.     Location, Bathroom: second floor, must negotiate stairs to access  Bathroom Access Comment: owns RTS + rails, will have shower chair + back [facility], owns grab bars in shower stall on 2nd floor     Prior Level of Function  Ambulation: independent  Transferring: independent  Toileting: independent  Bathing: independent  Dressing: independent  Eating: independent  Communication: understands/communicates without difficulty  Swallowing: swallows foods/liquids without difficulty  Prior Functional Level Comment:  (has shower bench,RTS, RW, getting grab bars)Dominant Hand: left          PT Treatment Summary - 03/26/18 1317        Session Details    Document Type daily treatment    Mode of Treatment group therapy;physical therapy       Time Calculation    Start Time 1300    Stop Time 1400    Time Calculation (min) 60 min       ROM: Left Knee    PROM Deficit: Extension/Flexion 0-110        ROM: Right Knee    PROM Deficit: Extension/Flexion 0-110       Bed Chair WC Transfer Training    Assistive Device (Transfers) walker,  front-wheeled    Sit-Stand Transfers, [place] close supervision    Stand-Sit Transfers, [place] close supervision    Stand Pivot Transfers, [place] close supervision    Supine to Sit, [place] close supervision    Sit to Supine, [place] close supervision       Gait Training    [place] close supervision    Assistive Device walker, front-wheeled    Distance in Feet 50 feet    Comment amb 2 trials, 50 ft x 2       LE Supine Therapeutic Exercise    Exercise(s) Performed bridging (bilateral w/bolster);ankle pumps;heel slides (hip/knee flexion/extension);gluteal sets;quadriceps sets;SAQ (short arc quad) over bolster    Sets/Reps Detail 3x10       PT Clinical Impression    Daily Outcome Statement good comfort of supine [place] despite report of increased pain at onset of session. Pt progressing well with B knee flexion ROM.        PT Weekly Outcome Summary    Functional Goal Overall Progress progressing toward functional goals as expected    Weekly Outcome Statement Patient's progress towards STG/LTGs continues to be limited by decreased strength BLEs, increased edema, increased post-op knee pain, impaired B knee ROM, decreased endurance and impaired balance w/ need for RW    Impairments Continuing to Limit Function decreased ROM;decreased flexibility;decreased strength;impaired balance;pain    Recommendations ongoing inpatient rehab, 3.0 hrs daily, 7 days weekly    Plan for Continued Service After Discharge outpatient PT                         PT Care Plan Goals    Flowsheet Row Most Recent Value   Stair Goal, PT   PT STG: Stairs  minimum assist (75% or less patient effort)   STG Number of Stairs  8 [using both HR.]   Physical Therapy STG Date Established: Stairs  03/24/18   PT STG Duration: Stairs  5 days or less   PT LTG: Stairs  modified independence   LTG Number of Stairs  15 [using 1 HR and an SPC.]   Physical Therapy LTG Date Established: Stairs  03/24/18   PT LTG Duration: Stairs  21 days or  less   Walking Locomotion Goal, PT   PT STG: Walking Locomotion  supervision required, verbal cues required   Physical Therapy STG Date Established: Walking Locomotion  03/24/18 [Distances > than or = to 175'.]   PT STG Duration: Walking Locomotion  5 days or less   PT LTG: Walking Locomotion  modified independence   Physical Therapy LTG Date Established: Walking Locomotion  03/24/18 [Distances > than or = to 300'.]   PT LTG Duration: Walking Locomotion  21 days or less   Additional Goal, PT   Physical Therapy STG: Date Established  03/24/18 [Pt. will increase B Knee PROM by > 10 degrees.]   PT STG Duration  5 days or less   Physical Therapy LTG: Date Established  03/24/18 [Pt. will increase B Knee PROM to > or = to 0-115 degrees.]   PT LTG Duration  21 days or less

## 2018-03-26 NOTE — PLAN OF CARE
Problem: Patient Care Overview  Goal: Plan of Care Status/Review  Outcome: Ongoing (interventions implemented as appropriate)   03/26/18 0540   Patient Care Overview   IRF Plan of Care Status progress ongoing, continue   Progress, Functional Goals demonstrating adequate progress   Coping/Psychosocial   Plan Of Care Reviewed With patient       Problem: Knee Arthroplasty (Total, Partial) (Adult)  Goal: Signs and Symptoms of Listed Potential Problems Will be Absent, Minimized or Managed (Knee Arthroplasty)  Outcome: Ongoing (interventions implemented as appropriate)      Problem: Functional Mobility, Impaired (Adult)  Goal: Functional Mobility Fxn [place]  Outcome: Ongoing (interventions implemented as appropriate)

## 2018-03-26 NOTE — PROGRESS NOTES
Patient: Carl Sessions  Location: [facility] [address]  MRN: [id number]  Today's date: 3/26/2018          Pain/Vitals - 03/26/18 0959        Pain/Comfort/Sleep    Presence of Pain complains of pain/discomfort    Pain Body Location knee    Pain Rating: Rest 4 - moderate pain       Vital Signs    Pulse 84    /60          Prior Living Environment  Lives With: spouse  Living Arrangements: house  Home Accessibility: stairs to enter home, other (see comments)  Living Environment Comment: Pt's 2 story home has 1+1 NILE, Bed and full bath on 2nd flr., 1/2 bath on 1st,  2nd floor bath has a small built-in corner bench.  DME; RWalker, Power scooter, tub seat, reacher, long handled shoe horn, sock thomas, dressing stick, and long handled sponge.     Location, Bathroom: second floor, must negotiate stairs to access  Bathroom Access Comment: owns RTS + rails, will have shower chair + back [facility], owns grab bars in shower stall on 2nd floor     Prior Level of Function  Ambulation: independent  Transferring: independent  Toileting: independent  Bathing: independent  Dressing: independent  Eating: independent  Communication: understands/communicates without difficulty  Swallowing: swallows foods/liquids without difficulty  Prior Functional Level Comment:  (has shower bench,RTS, RW, getting grab bars)Dominant Hand: left          OT Treatment Summary - 03/26/18 0959        Session Details    Document Type daily treatment    Mode of Treatment occupational therapy;individual therapy;concurrent therapy       Time Calculation    Start Time 0956    Stop Time 1056    Time Calculation (min) 60 min       Bed Chair WC Transfer Training    [place] close supervision    Comment (Bed Mobility) standard bed in ILU    Assistive Device (Transfers) walker, front-wheeled       Shower Transfer Training    [place] minimum assist (75% patient effort)    Assistive Device (Shower Transfer) shower  chair;walker, front-wheeled;grab bars/tub rail    Comment standard shower stall in ILU       Toilet Transfer    [place] close supervision    Assistive Device (Toilet Transfer) walker, front-wheeled    Comment RTS + rails over standard toilet in ILU       Gait Training    [place] close supervision    Assistive Device walker, front-wheeled    Distance in Feet 150 feet    Comment on birch unit with CL S; completed ILU ambulation over carpet and thresholds CL S with RW,        Static Standing Balance    [place], Unsupported Standing close supervision    Time Able to Maintain Position 1 to 2 minutes    Comment completed UE TE in stance with CL S for balance + cues for posture; completed standing trials x3 reps       LE Seated Therapeutic Exercise    Exercise(s) Performed knee extension;knee flexion;heel/toe raises    Exercise Type AROM (active range of motion)    Sets/Reps Detail 20x b/l       UE Standing Therapeutic Exercise    Exercise(s) Performed shoulder flexion;scapular elevation;scapular depression;shoulder abduction;elbow flexion;elbow extension    Exercise Type AROM (active range of motion)    Sets/Reps Detail 2x15 reps each side unweighted       OT Clinical Impression    Daily Outcome Statement Pt completed ILU txfers using DME per home set-up including RTS with rails, shower chair with back, plush arm chair with built up cushion. Will benefit from ADL review with AE prn.                    Education provided this session. See the Patient Education summary report for full details.    OT Care Plan Goals    Flowsheet Row Most Recent Value   Bathing Goal, OT   OT STG: Bathing  supervision required   OT STG Date Established: Bathing  03/24/18   OT STG Duration: Bathing  3 days or less   OT LTG: Bathing  modified independence   OT LTG Date Established: Bathing  03/24/18   OT LTG Duration: Bathing  14 days or less   Grooming Goal, OT   OT STG: Grooming  independent   OT STG Date Established: Grooming   03/24/18   OT STG Duration: Grooming  5 days or less   OT LTG: Grooming  independent   OT LTG Date Established: Grooming  03/24/18   OT LTG Duration: Grooming  14 days or less   Upper Body Dressing Goal, OT   OT STG: Upper Body Dressing  supervision required   Occupational Therapy STG Date Established: UB Dressing  03/24/18   OT STG Duration: Upper Body Dressing  5 days or less   OT LTG: Upper Body Dressing  modified independence   Occupational Therapy LTG Date Established: UB Dressing  03/24/18   OT LTG Duration: Upper Body Dressing  14 days or less   Lower Body Dressing Goal, OT   OT STG: Lower Body Dressing  supervision required   OT STG Date Established: LB Dressing  03/24/18   OT STG Duration: Lower Body Dressing  3 days or less   OT LTG: Lower Body Dressing  modified independence   OT LTG Date Established: LB Dressing  03/24/18   OT LTG Duration: Lower Body Dressing  14 days or less   Toilet Transfer Goal, OT   OT STG: Toilet Transfer  supervision required   Occupational Therapy STG Date Established: Toilet Transfer  03/24/18   OT STG Duration: Toilet Transfer  5 days or less   OT LTG: Toilet Transfer  modified independence   Occupational Therapy LTG Date Established: Toilet Transfer  03/24/18   OT LTG Duration: Toilet Transfer  14 days or less   Toileting Goal, OT   OT STG: Toileting  supervision required   OT STG Date Established: Toileting  03/24/18   OT STG Duration: Toileting  3 days or less   OT LTG: Toileting  modified independence   OT LTG Date Established: Toileting  03/24/18   OT LTG Duration: Toileting  14 days or less   Tub-Shower Transfer Goal, OT   OT STG: Tub-Shower Transfer  supervision required   OT STG Date Established: Tub Shower Transfer  03/24/18   OT STG Duration: Tub-Shower Transfer  5 days or less   OT LTG: Tub-Shower Transfer  modified independence   OT LTG Date Established: Tub Shower Transfer  03/24/18   OT LTG Duration: Tub-Shower Transfer  14 days or less

## 2018-03-26 NOTE — ASSESSMENT & PLAN NOTE
Bilateral total knee arthroplasties by Kishore Choe MD of orthopedic surgery at [facility] on March 19, 2018.  Patient tolerated surgery well.       Weight-bear as tolerated post surgery.    Complete empiric Keflex.    Apprehensive inpatient rehabilitation program to address functional deficits status post bilateral knee replacements.  Goals of modified independent to supervision with mobility self-care activities and return home.    The patient is tolerating and improving with therapies well.  Is improving with mobility and self-care activities.

## 2018-03-26 NOTE — PROGRESS NOTES
"Patient: Carl Sessions  Location: [facility] [address]  MRN: [id number]  Today's date: 3/26/2018          Pain/Vitals - 03/26/18 1602        Pain/Comfort/Sleep    Presence of Pain complains of pain/discomfort    Preferred Pain Scale number (Numeric Rating Pain Scale)    Pain Rating (0-10): Activity 7          Prior Living Environment  Lives With: spouse  Living Arrangements: house  Home Accessibility: stairs to enter home, other (see comments)  Living Environment Comment: Pt's 2 story home has 1+1 NILE, Bed and full bath on 2nd flr., 1/2 bath on 1st,  2nd floor bath has a small built-in corner bench.  DME; RWalker, Power scooter, tub seat, reacher, long handled shoe horn, sock thomas, dressing stick, and long handled sponge.     Location, Bathroom: second floor, must negotiate stairs to access  Bathroom Access Comment: owns RTS + rails, will have shower chair + back [facility], owns grab bars in shower stall on 2nd floor     Prior Level of Function  Ambulation: independent  Transferring: independent  Toileting: independent  Bathing: independent  Dressing: independent  Eating: independent  Communication: understands/communicates without difficulty  Swallowing: swallows foods/liquids without difficulty  Prior Functional Level Comment:  (has shower bench,RTS, RW, getting grab bars)Dominant Hand: left          PT Treatment Summary - 03/26/18 1602        Session Details    Document Type daily treatment    Mode of Treatment physical therapy;individual therapy    Patient/Family Observations Patient reports knee pain is more due to 90 minutes of therapy.         Time Calculation    Start Time 1602    Stop Time 1632    Time Calculation (min) 30 min       Range of Motion (ROM)    General Range of Motion other (see comments)    Comment, General Range of Motion seated bilateral HS stretch on 6\" box, 4x20 secs       Bed Chair WC Transfer Training    Chair-Bed Transfers, [place] supervision    " "Sit-Stand Transfers, [place] supervision    Stand-Sit Transfers, [place] supervision       Car Transfer    [place] close supervision    Assistive Device (Car Transfer) walker, front-wheeled       Gait Training    [place] supervision    Assistive Device walker, front-wheeled    Distance in Feet 200 feet    Comment decrease step length, slightly flexed posture       Stairs Training    Comment up/down 6\" curb and ramp with RW, Superv and cues       PT Clinical Impression    Daily Outcome Statement Patient performed curb and ramp without assistance and minimal cueing; patient also performed car transfer without assistance as well.  Patient returned to room at end of session and transferred into bed; recommended elevating LEs and CP to knees to decrease pain and swelling.  Cont POC                        PT Care Plan Goals    Flowsheet Row Most Recent Value   Stair Goal, PT   PT STG: Stairs  minimum assist (75% or less patient effort)   STG Number of Stairs  8 [using both HR.]   Physical Therapy STG Date Established: Stairs  03/24/18   PT STG Duration: Stairs  5 days or less   PT LTG: Stairs  modified independence   LTG Number of Stairs  15 [using 1 HR and an SPC.]   Physical Therapy LTG Date Established: Stairs  03/24/18   PT LTG Duration: Stairs  21 days or less   Walking Locomotion Goal, PT   PT STG: Walking Locomotion  supervision required, verbal cues required   Physical Therapy STG Date Established: Walking Locomotion  03/24/18 [Distances > than or = to 175'.]   PT STG Duration: Walking Locomotion  5 days or less   PT LTG: Walking Locomotion  modified independence   Physical Therapy LTG Date Established: Walking Locomotion  03/24/18 [Distances > than or = to 300'.]   PT LTG Duration: Walking Locomotion  21 days or less   Additional Goal, PT   Physical Therapy STG: Date Established  03/24/18 [Pt. will increase B Knee PROM by > 10 degrees.]   PT STG Duration  5 days or less   Physical Therapy LTG: " Date Established  03/24/18 [Pt. will increase B Knee PROM to > or = to 0-115 degrees.]   PT LTG Duration  21 days or less

## 2018-03-27 ENCOUNTER — APPOINTMENT (INPATIENT)
Dept: PHYSICAL THERAPY | Facility: REHABILITATION | Age: 63
DRG: 560 | End: 2018-03-27
Payer: OTHER GOVERNMENT

## 2018-03-27 ENCOUNTER — APPOINTMENT (INPATIENT)
Dept: OCCUPATIONAL THERAPY | Facility: REHABILITATION | Age: 63
DRG: 560 | End: 2018-03-27
Payer: OTHER GOVERNMENT

## 2018-03-27 PROCEDURE — 12800000 HC ROOM AND CARE SEMIPRIVATE REHAB

## 2018-03-27 PROCEDURE — 97150 GROUP THERAPEUTIC PROCEDURES: CPT | Mod: GP

## 2018-03-27 PROCEDURE — 63700000 HC SELF-ADMINISTRABLE DRUG: Performed by: PHYSICAL MEDICINE & REHABILITATION

## 2018-03-27 PROCEDURE — 63700000 HC SELF-ADMINISTRABLE DRUG: Performed by: HOSPITALIST

## 2018-03-27 PROCEDURE — 97110 THERAPEUTIC EXERCISES: CPT | Mod: GP | Performed by: PHYSICAL THERAPIST

## 2018-03-27 PROCEDURE — 97112 NEUROMUSCULAR REEDUCATION: CPT | Mod: GO

## 2018-03-27 PROCEDURE — 97110 THERAPEUTIC EXERCISES: CPT | Mod: GO

## 2018-03-27 PROCEDURE — 97116 GAIT TRAINING THERAPY: CPT | Mod: GP | Performed by: PHYSICAL THERAPIST

## 2018-03-27 RX ADMIN — CITALOPRAM HYDROBROMIDE 40 MG: 20 TABLET, FILM COATED ORAL at 08:01

## 2018-03-27 RX ADMIN — SIMVASTATIN 80 MG: 40 TABLET, FILM COATED ORAL at 20:51

## 2018-03-27 RX ADMIN — PREGABALIN 50 MG: 50 CAPSULE ORAL at 20:55

## 2018-03-27 RX ADMIN — MONTELUKAST SODIUM 10 MG: 10 TABLET, FILM COATED ORAL at 20:52

## 2018-03-27 RX ADMIN — ASPIRIN 325 MG: 325 TABLET, COATED ORAL at 08:01

## 2018-03-27 RX ADMIN — OXYCODONE HYDROCHLORIDE 5 MG: 5 TABLET ORAL at 16:31

## 2018-03-27 RX ADMIN — CEPHALEXIN 500 MG: 500 CAPSULE ORAL at 16:31

## 2018-03-27 RX ADMIN — OXYCODONE HYDROCHLORIDE 10 MG: 5 TABLET ORAL at 12:29

## 2018-03-27 RX ADMIN — SENNOSIDES AND DOCUSATE SODIUM 1 TABLET: 8.6; 5 TABLET ORAL at 20:52

## 2018-03-27 RX ADMIN — SENNOSIDES AND DOCUSATE SODIUM 1 TABLET: 8.6; 5 TABLET ORAL at 08:02

## 2018-03-27 RX ADMIN — LEVOTHYROXINE SODIUM 88 MCG: 88 TABLET ORAL at 06:38

## 2018-03-27 RX ADMIN — OXYCODONE HYDROCHLORIDE 10 MG: 5 TABLET ORAL at 08:16

## 2018-03-27 RX ADMIN — CEPHALEXIN 500 MG: 500 CAPSULE ORAL at 08:01

## 2018-03-27 RX ADMIN — OXYCODONE HYDROCHLORIDE 5 MG: 5 TABLET ORAL at 02:38

## 2018-03-27 RX ADMIN — OXYCODONE HYDROCHLORIDE 5 MG: 5 TABLET ORAL at 03:19

## 2018-03-27 RX ADMIN — CELECOXIB 200 MG: 200 CAPSULE ORAL at 08:01

## 2018-03-27 RX ADMIN — LIDOCAINE 2 PATCH: 246 PATCH TOPICAL at 08:14

## 2018-03-27 RX ADMIN — EZETIMIBE 10 MG: 10 TABLET ORAL at 20:50

## 2018-03-27 RX ADMIN — OXYCODONE HYDROCHLORIDE 5 MG: 5 TABLET ORAL at 21:31

## 2018-03-27 RX ADMIN — CEPHALEXIN 500 MG: 500 CAPSULE ORAL at 20:50

## 2018-03-27 RX ADMIN — PREGABALIN 50 MG: 50 CAPSULE ORAL at 08:14

## 2018-03-27 RX ADMIN — LISINOPRIL 1.25 MG: 2.5 TABLET ORAL at 08:04

## 2018-03-27 RX ADMIN — PANTOPRAZOLE SODIUM 40 MG: 40 TABLET, DELAYED RELEASE ORAL at 08:02

## 2018-03-27 RX ADMIN — CEPHALEXIN 500 MG: 500 CAPSULE ORAL at 12:29

## 2018-03-27 NOTE — PROGRESS NOTES
Patient: Carl Sessions  Location: [facility] [address]  MRN: [id number]  Today's date: 3/27/2018               03/27/18 1005   Pain/Comfort/Sleep   Presence of Pain complains of pain/discomfort   Preferred Pain Scale number (Numeric Rating Pain Scale)   Pain Body Location - Side Bilateral   Pain Body Location - Orientation incisional   Pain Body Location knee   Pain Rating (0-10): Rest 7   Pain Rating (0-10): Activity 10   Frequency intermittent   Quality aching   Vital Signs   Patient Activity At rest   Oxygen Therapy None (Room air)   /65   BP Location Left upper arm   BP Method Automatic   Patient Position Sitting   Patient Observation   Observations Pt. reporting a short, sharp pain in his                PT Treatment Summary - 03/27/18 1005        Session Details    Document Type daily treatment    Mode of Treatment individual therapy;physical therapy    Patient/Family Observations Pt. agreeable to tx.       Time Calculation    Start Time 1000    Stop Time 1100    Time Calculation (min) 60 min       General Information    Patient Profile Reviewed? yes       Bed Mobility/Transfers    Extremity Weight Bearing Status bilateral lower extremities       Bed Chair WC Transfer Training    Bed Mobility Assessment/Interventions bed mobility activities;supine to sit to supine    [place] supervision    Bed-Chair Transfers, [place] supervision    Chair-Bed Transfers, [place] supervision    Sit-Stand Transfers, [place] supervision    Verbal Cues (Sit-Stand Transfers) technique    Stand-Sit Transfers, [place] supervision    Verbal Cues (Stand-Sit Transfers) technique    Stand Pivot Transfers, [place] supervision    Verbal Cues (Stand Pivot/Stand Step Transfers) technique       Gait Analysis/Training    Gait/Stairs Locomotion Gait Training (Group);Stairs Training (Group)       Gait Training    [place] supervision    Assistive Device walker, front-wheeled     "Distance in Feet 200 feet    Gait Pattern Utilized step-through    Gait Deviations Identified antalgic    Maintains Weight Bearing Status able to maintain weight bearing status       Stairs Training    [place] close supervision    Stairs, Assistive Device railing;other (see comments)    Stairs, Handrail Location left side (ascending)    Number of Stairs 12    Stair Height 6 inches    Ascending Stairs Technique step-to-step    Descending Stairs Technique step-to-step    Comment Up and down 3 x 4 (6\" high) steps with 1 HR, and using a folded RW for support.       LE Seated Therapeutic Exercise    Exercise(s) Performed hip flexion;knee flexion;heel/toe raises;LAQ (long arc quad), knee extension;hamstring stretch    Device foam roll;other (see comments)    Exercise Type AROM (active range of motion);PROM (passive range of motion)    Expected Outcomes improve functional tolerance, community activity;strengthen normal movement patterns    Sets/Reps Detail 2 x 15     Ability to Transfer Skills transfers skills to functional activity most of the time       LE Supine Therapeutic Exercise    Exercise(s) Performed SAQ (short arc quad) over bolster;quadriceps sets;bridging (bilateral w/bolster)    Device foam roll;other (see comments)    Exercise Type AAROM (active assistive range of motion);AROM (active range of motion);isometric contraction, static    Expected Outcomes strengthen, facilitate independent active range of motion    Sets/Reps Detail 2 x 15    Ability to Transfer Skills transfers skills to functional activity most of the time       PT Clinical Impression    Daily Outcome Statement Pt. was able to perform a FF of steps with Supervision and vc's, using 1 HR and a folded RWalker.  New report of sharp, temporary posterior R knee pain to palpation.                   Education provided this session. See the Patient Education summary report for full details.    PT Care Plan Goals    Flowsheet Row Most Recent Value "   Stair Goal, PT   PT STG: Stairs  minimum assist (75% or less patient effort)   STG Number of Stairs  8   Physical Therapy STG Date Established: Stairs  03/24/18   PT STG Duration: Stairs  5 days or less   PT STG Outcome: Stairs  goal ongoing   PT LTG: Stairs  modified independence   LTG Number of Stairs  12   Physical Therapy LTG Date Established: Stairs  03/24/18   PT LTG Duration: Stairs  21 days or less   Physical Therapy LTG Date Reviewed: Stairs  03/27/18   PT LTG Outcome: Stairs  goal ongoing   Walking Locomotion Goal, PT   PT STG: Walking Locomotion  supervision required, verbal cues required   Physical Therapy STG Date Established: Walking Locomotion  03/24/18 [Distances > than or = to 175'.]   PT STG Duration: Walking Locomotion  5 days or less   Physical Therapy STG Date Reviewed: Walking Locomotion  03/27/18   PT STG Outcome: Walking Locomotion  goal ongoing   PT LTG: Walking Locomotion  modified independence   Physical Therapy LTG Date Established: Walking Locomotion  03/24/18 [Distances > than or = to 300'.]   PT LTG Duration: Walking Locomotion  14 days or less   Physical Therapy LTG Date Reviewed: Walking Locomotion  03/27/18   PT LTG Outcome: Walking Locomotion  goal ongoing   Additional Goal, PT   Physical Therapy STG: Date Established  03/24/18 [Pt. will increase B Knee PROM by > 10 degrees.]   PT STG Duration  5 days or less   Physical Therapy STG: Date Reviewed  03/27/18   PT STG Outcome  goal ongoing   Physical Therapy LTG: Date Established  03/24/18 [Pt. will increase B Knee PROM to > or = to 0-115 degrees.]   PT LTG Duration  21 days or less   Physical Therapy LTG: Date Reviewed  03/27/18   PT LTG Outcome  goal ongoing

## 2018-03-27 NOTE — PROGRESS NOTES
Patient: Carl Sessions  Location: [facility] [address]  MRN: [id number]  Today's date: 3/27/2018          Pain/Vitals - 03/27/18 1300        Pain/Comfort/Sleep    Presence of Pain complains of pain/discomfort    Preferred Pain Scale number (Numeric Rating Pain Scale)    Pain Body Location - Side Bilateral    Pain Body Location knee    Pain Rating (0-10): Rest 4       Vital Signs    Pulse 77    SpO2 98 %    /68          Prior Living Environment  Lives With: spouse  Living Arrangements: house  Home Accessibility: stairs to enter home, other (see comments)  Living Environment Comment: Pt's 2 story home has 1+1 NILE, Bed and full bath on 2nd flr., 1/2 bath on 1st,  2nd floor bath has a small built-in corner bench.  DME; RWalker, Power scooter, tub seat, reacher, long handled shoe horn, sock thomas, dressing stick, and long handled sponge.     Location, Bathroom: second floor, must negotiate stairs to access  Bathroom Access Comment: owns RTS + rails, will have shower chair + back [facility], owns grab bars in shower stall on 2nd floor     Prior Level of Function  Ambulation: independent  Transferring: independent  Toileting: independent  Bathing: independent  Dressing: independent  Eating: independent  Communication: understands/communicates without difficulty  Swallowing: swallows foods/liquids without difficulty  Prior Functional Level Comment:  (has shower bench,RTS, RW, getting grab bars)Dominant Hand: left          OT Treatment Summary - 03/27/18 8669        Session Details    Document Type daily treatment    Mode of Treatment individual therapy;occupational therapy       Time Calculation    Start Time 1300    Stop Time 1330    Time Calculation (min) 30 min       Bed Mobility/Transfers    Extremity Weight Bearing Status bilateral lower extremities   functional mobility with walker 50 feet with Cl S       Bed Chair WC Transfer Training    Sit-Stand Transfers, [place]  supervision;verbal cues    Verbal Cues (Sit-Stand Transfers) hand placement;technique    Stand-Sit Transfers, [place] supervision;verbal cues;1 person assist    Verbal Cues (Stand-Sit Transfers) hand placement;technique    Stand Pivot Transfers, [place] supervision;verbal cues;1 person assist       Static Standing Balance    [place], Supported Standing close supervision    Time Able to Maintain Position more than 5 minutes    Assistive Device Utilized standard walker    [place], Unsupported Standing verbal cues;close supervision;1 person assist   verbal cues to stand upright (pt. flexes at trunk)    Time Able to Maintain Position 2 to 3 minutes       Dynamic Standing Balance    [place], Reaches Outside Midline verbal cues;minimum assist (75% or more patient effort);1 person assist    Time Able to Maintain Position, Reaches Outside Midline 1 to 2 minutes       LE Seated Therapeutic Exercise    Exercise(s) Performed knee flexion;knee extension;ankle dorsiflexion;ankle plantarflexion    Exercise Type resistive exercise    Sets/Reps Detail 10 reps, 1 sets    Ability to Transfer Skills able to transfer skills from training to functional activity       UE Seated Therapeutic Exercise    Exercise(s) Performed shoulder flexion;shoulder extension;elbow flexion;elbow extension;wrist flexion;wrist extension;digit flexion;digit extension    Device elastic bands/tubing    Exercise Type resistive exercise    Expected Outcomes strengthen normal movement patterns    Sets/Reps Detail 10 reps, 1 sets    Ability to Transfer Skills transfers skills to functional activity most of the time       UE Standing Therapeutic Exercise    Exercise(s) Performed shoulder flexion;shoulder extension;elbow flexion;elbow extension;wrist flexion;wrist extension    Device elastic bands/tubing    Exercise Type resistive exercise    Sets/Reps Detail 10 reps, 1 set    Ability to Transfer Skills transfers skills to functional  activity most of the time       OT Clinical Impression    Daily Outcome Statement Pt. participated in OT session today and completed BUE strengthening (resistive red band) while seated on EOM and/or standing. (10 reps, 2 sets). Pt. also peformed knee flex/ext, plantar flex/dorsi flex 10 reps, 1 set while seated at EOM. CL S functional mobility with walker (50 feet). Cont. with POC.                         OT Care Plan Goals    Flowsheet Row Most Recent Value   Bathing Goal, OT   OT STG: Bathing  modified independence   OT STG Date Established: Bathing  03/24/18   OT STG Duration: Bathing  7 days or less   OT STG Date Reviewed:  03/26/18   OT STG Outcome: Bathing  goal ongoing, goal revised this date   OT LTG: Bathing  modified independence   OT LTG Date Established: Bathing  03/24/18   OT LTG Duration: Bathing  14 days or less   OT LTG Date Reviewed: Bathing  03/26/18   OT LTG Outcome: Bathing  goal ongoing, goal revised this date   Grooming Goal, OT   OT STG: Grooming  modified independence   OT STG Date Established: Grooming  03/24/18   OT STG Duration: Grooming  7 days or less   OT STG Date Reviewed: Grooming  03/26/18   OT STG Outcome: Grooming  goal revised this date, goal ongoing   OT LTG: Grooming  modified independence   OT LTG Date Established: Grooming  03/24/18   OT LTG Duration: Grooming  14 days or less   OT LTG Date Reviewed: Grooming  03/26/18   OT LTG Outcome: Grooming  goal revised this date, goal ongoing   Upper Body Dressing Goal, OT   OT STG: Upper Body Dressing  independent   Occupational Therapy STG Date Established: UB Dressing  03/24/18   OT STG Duration: Upper Body Dressing  7 days or less   Occupational Therapy STG Date Reviewed: UB Dressing  03/26/18   OT STG Outcome: Upper Body Dressing  goal ongoing, goal revised this date   OT LTG: Upper Body Dressing  modified independence   Occupational Therapy LTG Date Established: UB Dressing  03/24/18   OT LTG Duration: Upper Body Dressing  14 days  or less   Occupational Therapy LTG Date Reviewed: UB Dressing  03/26/18   OT LTG Outcome: Upper Body Dressing  goal revised this date, goal ongoing   Lower Body Dressing Goal, OT   OT STG: Lower Body Dressing  modified independence   OT STG Date Established: LB Dressing  03/24/18   OT STG Duration: Lower Body Dressing  7 days or less   OT STG Date Reviewed: LB Dressing  03/26/18   OT STG Outcome: Lower Body Dressing  goal revised this date, goal ongoing   OT LTG: Lower Body Dressing  modified independence   OT LTG Date Established: LB Dressing  03/24/18   OT LTG Duration: Lower Body Dressing  14 days or less   OT LTG Date Reviewed: LB Dressing  03/26/18   OT LTG Outcome: Lower Body Dressing  goal revised this date, goal ongoing   Toilet Transfer Goal, OT   OT STG: Toilet Transfer  modified independence   Occupational Therapy STG Date Established: Toilet Transfer  03/24/18   OT STG Duration: Toilet Transfer  7 days or less   Occupational Therapy STG Date Reviewed: Toilet Transfer  03/26/18   OT STG Outcome: Toilet Transfer  goal revised this date, goal ongoing   OT LTG: Toilet Transfer  modified independence   Occupational Therapy LTG Date Established: Toilet Transfer  03/24/18   OT LTG Duration: Toilet Transfer  14 days or less   Occupational Therapy LTG Date Reviewed: Toilet Transfer  03/26/18   OT LTG Outcome: Toilet Transfer  goal revised this date, goal ongoing   Toileting Goal, OT   OT STG: Toileting  modified independence   OT STG Date Established: Toileting  03/24/18   OT STG Duration: Toileting  7 days or less   OT STG Date Reviewed: Toileting  03/26/18   OT STG Outcome: Toileting  goal revised this date, goal ongoing   OT LTG: Toileting  modified independence   OT LTG Date Established: Toileting  03/24/18   OT LTG Duration: Toileting  14 days or less   OT LTG Date Reviewed: Toileting  03/26/18   OT LTG Outcome: Toileting  goal revised this date, goal ongoing   Tub-Shower Transfer Goal, OT   OT STG:  Tub-Shower Transfer  modified independence   OT STG Date Established: Tub Shower Transfer  03/24/18   OT STG Duration: Tub-Shower Transfer  7 days or less   OT STG Date Reviewed: Tub Shower Transfer  03/26/18   OT STG Outcome: Tub-Shower Transfer  goal ongoing, goal revised this date   OT LTG: Tub-Shower Transfer  modified independence   OT LTG Date Established: Tub Shower Transfer  03/24/18   OT LTG Duration: Tub-Shower Transfer  14 days or less   OT LTG Date Reviewed: Tub Shower Transfer  03/26/18   OT LTG Outcome: Tub-Shower Transfer  goal revised this date, goal ongoing

## 2018-03-27 NOTE — PROGRESS NOTES
"Daily Progress Note       Patient was seen and examined.   Attestation Notes: Face to face encounter completed    Subjective    Pain fair control.  Progressing in therapies.  Objective     /72   Pulse 79   Temp 37 °C (98.6 °F)   Resp 16   Ht 1.676 m (5' 6\")   Wt 94.1 kg (207 lb 6.4 oz)   SpO2 97%   BMI 33.48 kg/m²      No intake or output data in the 24 hours ending 03/11/18 0953  Intake/Output this shift:  No intake/output data recorded.    Review of Systems:  Pertinent items are noted in HPI.  Denies chest pain and shortness of breath.  Review of systems otherwise negative.    Labs     Results from last 7 days  Lab Units 03/26/18  0619   WBC K/uL 7.45   HEMOGLOBIN g/dL 12.5*   HEMATOCRIT % 37.3*   PLATELETS K/uL 267       Results from last 7 days  Lab Units 03/26/18  0619   SODIUM mmol/L 135*   POTASSIUM mmol/L 4.3   CHLORIDE mmol/L 99   CO2 mmol/L 32   BUN mg/dL 20   CREATININE mg/dL 0.8   CALCIUM mg/dL 8.9   ALBUMIN g/dL 3.5   BILIRUBIN TOTAL mg/dL 1.5*   ALK PHOS IU/L 118   ALT IU/L 34   AST IU/L 20   GLUCOSE mg/dL 89         Imaging  Not applicable    Full Code    Physical Exam  General      Alert, cooperative, no distress, appears stated age.   Head:    Normocephalic, without obvious abnormality, atraumatic.   Eyes:    PERRL, conjunctiva/corneas clear, EOM's intact.        Nose:   Nares normal, septum midline, mucosa normal, no drainage or            sinus tenderness.   Throat:   Lips, mucosa, and tongue normal.    Neck:   Supple, symmetrical, trachea midline.    Back:     Symmetric, no curvature.   Lungs:     Clear to auscultation bilaterally, respirations unlabored.   Chest wall:    No tenderness or deformity.   Heart:    Regular rate and rhythm, S1 and S2 normal.   Abdomen:     Soft, non-tender, bowel sounds active all four quadrants,     no masses, no organomegaly.   Extremities:  Musculoskeletal:  1+ lower extremity edema bilaterally.   Bilateral total knee arthroplasties.      Pulses:   1+ " and symmetric all extremities.   Skin:   Incisions healing well   Neurologic:          Behavior/  Emotional:  CNII-XII intact.  Alert and oriented ×3.  Motor exam improving quad control  Sensory exam intact.  Reflexes stable decreased reflexes.      Appropriate, cooperative           Plan of care was discussed with patient    Assessment & Plan  * S/P total knee arthroplasty, bilateral   Assessment & Plan    Bilateral total knee arthroplasties by Kishore Choe MD of orthopedic surgery at [facility] on March 19, 2018.  Patient tolerated surgery well.       Weight-bear as tolerated post surgery.    Complete empiric Keflex.    Apprehensive inpatient rehabilitation program to address functional deficits status post bilateral knee replacements.  Goals of modified independent to supervision with mobility self-care activities and return home.    The patient is tolerating and improving with therapies well.  Iimproving with mobility and self-care activities.  ROM improving.        Postoperative pain   Assessment & Plan    Oxycodone as needed for pain control.  Tylenol as needed.  Continue Celebrex.  Continue Lyrica 50 mg twice a day.  Added lidocaine patches with improvement.        At moderate risk for deep venous thrombosis   Assessment & Plan    Patient had Doppler ultrasounds of the lower extremities this morning and has a left posterior tibial DVT.  Continue with aspirin per orthopedic protocol.  Recheck Dopplers later in the week to monitor for progression.        Depression   Assessment & Plan    Continue current dose of Celexa.  Staff support  Can consider psychology consult        Proteinuria   Assessment & Plan    Continue lisinopril.  Monitor blood pressure.        Mild intermittent asthma   Assessment & Plan    Continue Singulair.  Incentive spirometry.        DM2 (diabetes mellitus, type 2) (CMS/[facility]) ([facility])   Assessment & Plan    Monitor blood sugars.  Diabetic diet.        Hypothyroid   Assessment &  Plan    Levothyroxine to continue.        Mixed hyperlipidemia   Assessment & Plan    Continue on Zocor.        Atelectasis   Assessment & Plan    Incentive spirometry.        Bilateral lower extremity edema   Assessment & Plan    Edema control with elevating legs as needed.          Anemia due to blood loss   Assessment & Plan    Hemoglobin stable at 12.5            Expected Discharge Date:  3/30/2018

## 2018-03-27 NOTE — ASSESSMENT & PLAN NOTE
Lower extremity Doppler done revealed a Left Tibial vein thrombosis  this is a distal clot okay to monitor and recheck Dopplers at the end of the week  Continue aspirin 325 mg daily  He has no shortness of breath or chest pain

## 2018-03-27 NOTE — PLAN OF CARE
Problem: Functional Mobility, Impaired (Adult)  Goal: Functional Mobility Fxn [place]  Outcome: Ongoing (interventions implemented as appropriate)

## 2018-03-27 NOTE — PROGRESS NOTES
Patient: Carl Chow  [facility]: [facility] [address]  MRN: [id number]  Today's date: 3/27/2018         Prior Living Environment  Lives With: spouse  Living Arrangements: house  Home Accessibility: stairs to enter home, other (see comments)  Living Environment Comment: Pt's 2 story home has 1+1 NILE, Bed and full bath on 2nd flr., 1/2 bath on 1st,  2nd floor bath has a small built-in corner bench.  DME; RWalker, Power scooter, tub seat, reacher, long handled shoe horn, sock thomas, dressing stick, and long handled sponge.     Location, Bathroom: second floor, must negotiate stairs to access  Bathroom Access Comment: owns RTS + rails, will have shower chair + back [facility], owns grab bars in shower stall on 2nd floor     Prior Level of Function  Ambulation: independent  Transferring: independent  Toileting: independent  Bathing: independent  Dressing: independent  Eating: independent  Communication: understands/communicates without difficulty  Swallowing: swallows foods/liquids without difficulty  Prior Functional Level Comment:  (has shower bench,RTS, RW, getting grab bars)Dominant Hand: left          PT Treatment Summary - 03/27/18 0807        PT Weekly Outcome Summary    Functional Goal Overall Progress progressing toward functional goals as expected    Weekly Outcome Statement Patient's progress towards STG/LTGs continues to be limited by decreased strength BLEs, increased edema, increased post-op knee pain, impaired B knee ROM, decreased endurance and impaired balance w/ need for RW    Impairments Continuing to Limit Function decreased ROM;decreased strength;impaired balance;impaired coordination;impaired motor control    Recommendations ongoing inpatient rehab, 3.0 hour daily, 7 days/week     Plan for Continued Service After Discharge Outpatient PT                         PT Care Plan Goals    Flowsheet Row Most Recent Value   Stair Goal, PT   PT STG: Stairs  minimum assist  (75% or less patient effort)   STG Number of Stairs  8 [using both HR.]   Physical Therapy STG Date Established: Stairs  03/24/18   PT STG Duration: Stairs  5 days or less   PT LTG: Stairs  modified independence   LTG Number of Stairs  15 [using 1 HR and an SPC.]   Physical Therapy LTG Date Established: Stairs  03/24/18   PT LTG Duration: Stairs  21 days or less   Walking Locomotion Goal, PT   PT STG: Walking Locomotion  supervision required, verbal cues required   Physical Therapy STG Date Established: Walking Locomotion  03/24/18 [Distances > than or = to 175'.]   PT STG Duration: Walking Locomotion  5 days or less   PT LTG: Walking Locomotion  modified independence   Physical Therapy LTG Date Established: Walking Locomotion  03/24/18 [Distances > than or = to 300'.]   PT LTG Duration: Walking Locomotion  21 days or less   Additional Goal, PT   Physical Therapy STG: Date Established  03/24/18 [Pt. will increase B Knee PROM by > 10 degrees.]   PT STG Duration  5 days or less   Physical Therapy LTG: Date Established  03/24/18 [Pt. will increase B Knee PROM to > or = to 0-115 degrees.]   PT LTG Duration  21 days or less

## 2018-03-27 NOTE — PROGRESS NOTES
Patient: Carl Chow  Location: [facility] [address]  MRN: [id number]  Today's date: 3/27/2018          Pain/Vitals - 03/27/18 1139        Pain/Comfort/Sleep    Presence of Pain complains of pain/discomfort    Preferred Pain Scale number (Numeric Rating Pain Scale)    Pain Body Location - Side Bilateral    Pain Body Location knee    Pain Rating (0-10): Rest 5    Pain Rating (0-10): Activity 8    Frequency intermittent    Quality aching    Pain Management Interventions premedicated for activity       Vital Signs    Pulse 77    /68    BP Location Right upper arm    BP Method Automatic    Patient Position Sitting          Prior Living Environment  Lives With: spouse  Living Arrangements: house  Home Accessibility: stairs to enter home, other (see comments)  Living Environment Comment: Pt's 2 story home has 1+1 NILE, Bed and full bath on 2nd flr., 1/2 bath on 1st,  2nd floor bath has a small built-in corner bench.  DME; RWalker, Power scooter, tub seat, reacher, long handled shoe horn, sock thomas, dressing stick, and long handled sponge.     Location, Bathroom: second floor, must negotiate stairs to access  Bathroom Access Comment: owns RTS + rails, will have shower chair + back delivered from [facility], owns grab bars in shower stall on 2nd floor     Prior Level of Function  Ambulation: independent  Transferring: independent  Toileting: independent  Bathing: independent  Dressing: independent  Eating: independent  Communication: understands/communicates without difficulty  Swallowing: swallows foods/liquids without difficulty  Prior Functional Level Comment:  (has shower bench,RTS, RW, getting grab bars)Dominant Hand: left          OT Treatment Summary - 03/27/18 1200        Bed Chair WC Transfer Training    Sit-Stand Transfers, [place] --    Stand-Sit Transfers, [place] --    Stand Pivot Transfers, [place] --       Static Standing Balance    [place], Unsupported Standing  --    Time Able to Maintain Position --    Comment --       LE Seated Therapeutic Exercise    Exercise(s) Performed --                        OT Care Plan Goals    Flowsheet Row Most Recent Value   Bathing Goal, OT   OT STG: Bathing  modified independence   OT STG Date Established: Bathing  03/24/18   OT STG Duration: Bathing  7 days or less   OT STG Date Reviewed:  03/26/18   OT STG Outcome: Bathing  goal ongoing, goal revised this date   OT LTG: Bathing  modified independence   OT LTG Date Established: Bathing  03/24/18   OT LTG Duration: Bathing  14 days or less   OT LTG Date Reviewed: Bathing  03/26/18   OT LTG Outcome: Bathing  goal ongoing, goal revised this date   Grooming Goal, OT   OT STG: Grooming  modified independence   OT STG Date Established: Grooming  03/24/18   OT STG Duration: Grooming  7 days or less   OT STG Date Reviewed: Grooming  03/26/18   OT STG Outcome: Grooming  goal revised this date, goal ongoing   OT LTG: Grooming  modified independence   OT LTG Date Established: Grooming  03/24/18   OT LTG Duration: Grooming  14 days or less   OT LTG Date Reviewed: Grooming  03/26/18   OT LTG Outcome: Grooming  goal revised this date, goal ongoing   Upper Body Dressing Goal, OT   OT STG: Upper Body Dressing  independent   Occupational Therapy STG Date Established: UB Dressing  03/24/18   OT STG Duration: Upper Body Dressing  7 days or less   Occupational Therapy STG Date Reviewed: UB Dressing  03/26/18   OT STG Outcome: Upper Body Dressing  goal ongoing, goal revised this date   OT LTG: Upper Body Dressing  modified independence   Occupational Therapy LTG Date Established: UB Dressing  03/24/18   OT LTG Duration: Upper Body Dressing  14 days or less   Occupational Therapy LTG Date Reviewed: UB Dressing  03/26/18   OT LTG Outcome: Upper Body Dressing  goal revised this date, goal ongoing   Lower Body Dressing Goal, OT   OT STG: Lower Body Dressing  modified independence   OT STG Date Established: LB  Dressing  03/24/18   OT STG Duration: Lower Body Dressing  7 days or less   OT STG Date Reviewed: LB Dressing  03/26/18   OT STG Outcome: Lower Body Dressing  goal revised this date, goal ongoing   OT LTG: Lower Body Dressing  modified independence   OT LTG Date Established: LB Dressing  03/24/18   OT LTG Duration: Lower Body Dressing  14 days or less   OT LTG Date Reviewed: LB Dressing  03/26/18   OT LTG Outcome: Lower Body Dressing  goal revised this date, goal ongoing   Toilet Transfer Goal, OT   OT STG: Toilet Transfer  modified independence   Occupational Therapy STG Date Established: Toilet Transfer  03/24/18   OT STG Duration: Toilet Transfer  7 days or less   Occupational Therapy STG Date Reviewed: Toilet Transfer  03/26/18   OT STG Outcome: Toilet Transfer  goal revised this date, goal ongoing   OT LTG: Toilet Transfer  modified independence   Occupational Therapy LTG Date Established: Toilet Transfer  03/24/18   OT LTG Duration: Toilet Transfer  14 days or less   Occupational Therapy LTG Date Reviewed: Toilet Transfer  03/26/18   OT LTG Outcome: Toilet Transfer  goal revised this date, goal ongoing   Toileting Goal, OT   OT STG: Toileting  modified independence   OT STG Date Established: Toileting  03/24/18   OT STG Duration: Toileting  7 days or less   OT STG Date Reviewed: Toileting  03/26/18   OT STG Outcome: Toileting  goal revised this date, goal ongoing   OT LTG: Toileting  modified independence   OT LTG Date Established: Toileting  03/24/18   OT LTG Duration: Toileting  14 days or less   OT LTG Date Reviewed: Toileting  03/26/18   OT LTG Outcome: Toileting  goal revised this date, goal ongoing   Tub-Shower Transfer Goal, OT   OT STG: Tub-Shower Transfer  modified independence   OT STG Date Established: Tub Shower Transfer  03/24/18   OT STG Duration: Tub-Shower Transfer  7 days or less   OT STG Date Reviewed: Tub Shower Transfer  03/26/18   OT STG Outcome: Tub-Shower Transfer  goal ongoing, goal  revised this date   OT LTG: Tub-Shower Transfer  modified independence   OT LTG Date Established: Tub Shower Transfer  03/24/18   OT LTG Duration: Tub-Shower Transfer  14 days or less   OT LTG Date Reviewed: Tub Shower Transfer  03/26/18   OT LTG Outcome: Tub-Shower Transfer  goal revised this date, goal ongoing

## 2018-03-27 NOTE — PATIENT CARE CONFERENCE
Inpatient Rehabilitation Team Conference Note  Date: 3/27/2018   Time: 9:12 AM       Patient Name:  Carl Chow       Medical Record Number: [id number]   YOB: 1955  Sex: Male          Room/Bed:  145/145W  Payor Info:  Payor: [employer] / Plan: [email or web address]  ADMINISTRATION / Product Type: Commercial /     Admitting Diagnosis: S/P total knee arthroplasty, bilateral [Z96.653]   Admit Date/Time:  3/23/2018  5:06 PM  Admission Comments: No comment available     Primary Diagnosis:  S/P total knee arthroplasty, bilateral  Principal Problem: S/P total knee arthroplasty, bilateral       Patient Active Problem List    Diagnosis Date Noted   • At moderate risk for deep venous thrombosis 03/26/2018   • Postoperative pain 03/24/2018   • Depression 03/24/2018   • S/P total knee arthroplasty, bilateral 03/23/2018   • Anemia due to blood loss 03/23/2018   • Bilateral lower extremity edema 03/23/2018   • Atelectasis 03/23/2018   • Mixed hyperlipidemia 03/23/2018   • Hypothyroid 03/23/2018   • DM2 (diabetes mellitus, type 2) (CMS/HCC) ([facility]) 03/23/2018   • Mild intermittent asthma 03/23/2018   • Proteinuria 03/23/2018       Premorbid Status:  Ambulation: independent  Transferring: independent  Toileting: independent  Bathing: independent  Dressing: independent  Eating: independent  Swallowing: swallows foods/liquids without difficulty    Current Functional Status:  Mobility  Gait  [place]: supervision  Assistive Device: walker, front-wheeled  Gait Pattern Utilized: step-through  Gait Deviations Identified: antalgic  Comment: decrease step length, slightly flexed posture  Maintains Weight Bearing Status: cues to maintain weight bearing status  Stairs  [place]: close supervision  Stairs, Assistive Device: railing  Stairs, Handrail Location: both sides  Number of Stairs: 12  Stair Height: 6 inches  Ascending Stairs Technique: step-over-step  Descending Stairs Technique: step-over-step  Comment:  "up/down 6\" curb and ramp with RW, Superv and cues  Wheelchair  No Data Recorded  Transfers  [place]: close supervision  Comment (Bed Mobility): standard bed in ILU  Roll Left, [place]: modified independence (Using bedrails.)  Roll Right, [place]: modified independence (Using bedrails.)  Supine to Sit, [place]: close supervision  Sit to Supine, [place]: close supervision  Supine to Sit to Supine, [place]: close supervision  Bed-Chair Transfers, [place]: close supervision  Chair-Bed Transfers, [place]: supervision  Sit-Stand Transfers, [place]: supervision  Verbal Cues (Sit-Stand Transfers): safety;technique  Stand-Sit Transfers, [place]: supervision  Verbal Cues (Stand-Sit Transfers): safety;technique  Stand Pivot Transfers, [place]: close supervision;verbal cues  Verbal Cues (Stand Pivot/Stand Step Transfers): safety;technique  Assistive Device (Transfers): walker, front-wheeled  [place]: minimum assist (75% patient effort)  Assistive Device (Shower Transfer): shower chair;walker, front-wheeled;grab bars/tub rail  Comment: standard shower stall in ILU  [place]: close supervision  Assistive Device (Toilet Transfer): walker, front-wheeled  Transfer Techniques: standing pivot  Comment: RTS + rails over standard toilet in ILU    Self Care  Upper Body Dressing Tasks: pull over garment  Upper Body Dressing Self-Performance: threads left arm, shirt;threads right arm, shirt;pulls shirt over head/around back;pulls shirt down/adjusts  [place] Assistance: obtain clothes  Upper Body Dressing Position: edge of bed sitting  Lower Body Dressing Tasks: shoes/slippers;doff  Lower Body Dressing Self-Performance: dons/doffs shoes  [place] Assistance: obtains clothes  Lower Body Dressing Position: edge of bed sitting  Comment: supervision at EOB  Self-Performance: chest;left arm;right arm;abdomen;front perineal area;buttocks;left upper leg;right upper leg;left lower leg, " including foot;right lower leg, including foot  [place] Assistance: safety considerations  Bathing Position: supported sitting  Comment: Supervision  [place]: supervision  Assistive Device Use: grab bar/safety frame;urinal;raised toilet seat  Self-Performance: washes, rinses and dries face;washes, rinses and dries hands;oral care (brushing teeth, cleaning dentures;shaves face  Grooming Position: supported standing;unsupported sitting  [place]: independent  Cognition  Problem Solving: initiates requests  Basic Problems: Solves routine problems without difficulty  Complex Problems: Solves without difficulty  Memory Deficit: immediate recall  Memory [place] Level: Recognizes and remembers without difficulty  Social Interaction: Interacts appropriately with others  Communication  Basic Needs and Ideas: Expresses without difficulty  Complex or Abstract Ideas: Expresses without difficulty  Basic Directions and Conversations: Understands without difficulty  Complex Directions and Conversations: Understands without difficulty    Weekly Outcome Summaries:  PT Weekly Outcome Summary  Functional Goal Overall Progress: progressing toward functional goals as expected  Weekly Outcome Statement: Patient's progress towards STG/LTGs continues to be limited by decreased strength BLEs, increased edema, increased post-op knee pain, impaired B knee ROM, decreased endurance and impaired balance w/ need for RW  Impairments Continuing to Limit Function: decreased ROM, decreased strength, impaired balance, impaired coordination, impaired motor control  Recommendations: ongoing inpatient rehab, 3.0 hour daily, 7 days/week   Plan for Continued Service After Discharge: Outpatient PT   OT Weekly Outcome Summary  Functional Goal Overall Progress: progressing toward functional goals as expected  Weekly Outcome Statement: At this time pt demos slow progress with IP OT towards established goals however remains highly motivated for  return to PLOF. Pt is receptive to education/training and DME recs; Pt currently rquires Min A for shower stall txfer, CL S for all other txfers.   Impairments Continuing to Limit Function: decreased flexibility, decreased ROM, decreased strength, pain  Recommendations: conitnued IP OT at current intensity  Plan for Continued Service After Discharge: d/c of OT services  Psychology Weekly Outcome Summary  Weekly Progress Summary: progressing toward goals as expected  Weekly Outcome Statement: 61 yo male admitted c B TKR.  Pleased c progress. Frustrated by pain med mgmt today;  unable to get meds to coincide c tx times. Highly motivated for tx; looks forward to being very active in outdoor sports. states wife does not handle change  (such as prospect of grab bars) easily. Wife stressed by issues involving her parents at this time. Pt has h/o depression and is on Celexa, which he feels keeps his mood stable. [place].  Weekly Progress Summary: progressing toward goals as expected  Weekly Outcome Statement: 61 yo male admitted c B TKR.  Pleased c progress. Frustrated by pain med mgmt today;  unable to get meds to coincide c tx times. Highly motivated for tx; looks forward to being very active in outdoor sports. states wife does not handle change  (such as prospect of grab bars) easily. Wife stressed by issues involving her parents at this time. Pt has h/o depression and is on Celexa, which he feels keeps his mood stable. [place].    Problem Resolution:    Bladder Management: other (see comments)  Bowel Management: bowel agents for managementSelf-Care Promotion: independence encouraged, BADL personal objects within reach             Goals/Support System:       Multi-Disciplinary Problems     Active Problems     Problem: Patient Care Overview  Start Date: 03/23/18    Goal Start Date End Date    Plan of Care Status/Review 03/23/18 --    Goal Start Date End Date    Individualization & Mutuality 03/23/18 --    Goal Start  Date End Date    Home Safety Plan 03/23/18 --    Goal Intervention Frequency Start Date End Date    Promote Safety in the Home/Community Environment Per CPG 03/23/18 --    Intervention Details:  Ensure understanding of fall risk, prevention and management  Promote safe and proper techniques for wheelchair use  Determine adaptations/modifications necessary to minimize risk of injury for patient within the home environment  Utilize safe strategies  related to water temperature/scald risk (e.g., water temperature set below 120 degrees F, installation of anti-scald devices) and safe strategies related to electrical appliances and water  Promote safe and effective caregiver guarding techniques and pr oper body mechanics during transfers and ambulation including ramps, curbs, steps, and outdoor surfaces  Identify ability or lack of ability to safely operate a motor vehicle  Promote effective/safe use of public transportation  Identify home evacuation  plan and how to call for emergency assistance     Goal Intervention Frequency Start Date End Date    Promote Fall Prevention Per CPG 03/23/18 --    Intervention Details:  Promote protective measures for fall prevention and to minimize injury during falls  Develop strategies to get up safely after falling     Goal Start Date End Date    Coping Plan 03/23/18 --    Goal Intervention Frequency Start Date End Date    Optimize Coping Strategies/Quality of Life Per CPG 03/23/18 --    Intervention Details:  Identify strengths related to coping (e.g., family, [place] [employer], pets, sense of control, self-determination, community support, caregiver support, attitudes related to disability).  Facilitate understanding of personal stressors/effect on coping skil ls.  Identify family coping strategies, enhance support system.  Identify caregiver stressors/effect on coping strategies.  Eliminate/minimize ineffective coping patterns/negative coping behaviors.  Foster self-efficacy related  to current situation.  Fac ilitate effective coping strategies (e.g., removal of barriers, activation of strengths, activation of resources, promotion of independence).  Awareness of and ability to access available resources.     Goal Start Date End Date    Community Reintegration Plan 18 --    Goal Intervention Frequency Start Date End Date    Support Safe Transition to the Community Environment Per CPG 18 --    Intervention Details:  Assess patient/family support system and facilitate patient/family ability to advocate for patient's health (e.g., direction of care, following healthcare instructions/advise)  Promote life style adjustment related to illness/disability  Encourage involv ement in all aspects of care planning/decision making and participation in preventing complications/further disability  Foster maintenance of friendship networks, promote peer support network interactions           Problem: Knee Arthroplasty (Total, Partial) (Adult)  Start Date: 18    Problem Details:  Prevent and manage potential problems includin. bleeding/anemia2. bowel motility decreased3. decreased range of motion4. functional deficit5. infection6. pain7. peripheral nerve impairment8. postoperative nausea and vomiting9. postoperative urinary re [id number]. respiratory [id number]. situational [id number]. VTE (venous thromboembolism)13. wound healing impaired     Goal Start Date End Date    Signs and Symptoms of Listed Potential Problems Will be Absent, Minimized or Managed (Knee Arthroplasty) 18 --    Goal Details:  Signs and symptoms of listed potential problems will be absent, minimized or managed by discharge/transition of care (reference Knee Arthroplasty (Total, Partial) (Adult) CPG).     Prob Intervention Frequency Start Date End Date    Support Surgical/Anesthesia Recovery Per CPG 18 --    Intervention Details:  Correlate clinical status to anesthesia type, method and expected pattern of  anesthesia progression/clearance  Monitor/maintain airway patency  Promote pulmonary hygiene  Judiciously administer oxygen therapy to avoid masking hypoventilation  Facilitate  frequent position changes  Protect and closely monitor all areas of decreased sensation  Closely monitor body temperature during the immediate postoperative period  Evaluate activity/mobility level and safety during transfer/ambulation  Use safety equipm ent and preventive measures to minimize fall risk     Prob Intervention Frequency Start Date End Date    Monitor/Manage Postoperative Bleeding Per CPG 03/23/18 --    Intervention Details:  Monitor for signs of bleeding/identify source  Assess bleeding risk related to surgical procedure  Maintain body temperature within desired range  Maintain dressing integrity; reinforce dressing/apply direct pressure to actively bleeding site  Measure/re cord intake and output  Evaluate for orthostatic hypotension  Anticipate need for fluid volume replacement (fluid/ blood components)     Prob Intervention Frequency Start Date End Date    Manage Acute Orthopaedic-related Pain Per CPG 03/23/18 --    Intervention Details:  Use a consistent pain scale for regular/frequent assessment  Mutually establish pain plan  Elevate affected extremity and utilize cryotherapy  Premedicate prior to planned activity/therapy/treatment  Use a multimodal approach (e.g., more than one type/ro [place] of medication)  Recognize need to maintain chronic regimen with additional dosing to manage acute pain  Titrate medications to patient response     Prob Intervention Frequency Start Date End Date    Prevent/Manage Post-surgical Infection Per CPG 03/23/18 --    Intervention Details:  Implement transmission-based precautions as appropriate  Minimize exposure/entry of organisms  Anticipate discontinuation of prophylactic antibiotics 24 hours after surgery  Perform daily review of invasive device necessity; promptly remove unnecessary d  evices  Provide fever/comfort measures  Monitor for early signs of sepsis  Anticipate the need for fluid resuscitation/advanced support     Prob Intervention Frequency Start Date End Date    Promote Early Mobility/Activity Per CPG 03/23/18 --    Intervention Details:  Facilitate strengthening of the affected lower extremity through therapeutic exercises  Prevent/treat edema in affected extremity  Perform therapeutic interventions (e.g., gait training, transfer training)  Facilitate increased independence with bed mobi lity, transfers and functional ambulation using appropriate assistive/adaptive equipment  Promote/Reinforce appropriate weight bearing status     Prob Intervention Frequency Start Date End Date    Promote Active Range of Motion/Prevent Adhesions Per CPG 03/23/18 --    Intervention Details:  Evaluate knee passive and active range of motion  Encourage active knee movement through full arc of motion and progression toward meeting flexion/extension milestones  Position to prevent knee flexion contractures and promote knee extension  Avoid/disco urage measures that prevent the knee from moving into extension (e.g. pillows beneath knee)     Prob Intervention Frequency Start Date End Date    Prevent/Manage Impaired Postoperative Elimination Per CPG 03/23/18 --    Intervention Details:  Encourage early activity/mobility  Establish regular, unhurried time for elimination  Promote privacy, comfort and position to facilitate the elimination process  Promote continuation of home bowel regimen  Consider dietary interventions such as warm liq uids or increased fiber  Utilize a bladder ultrasound scan with suspected urinary retention  Anticipate the need for intermittent bladder catheterization for post void residual     Prob Intervention Frequency Start Date End Date    Prevent/Minimize Functional Deficit Per CPG 03/23/18 --    Intervention Details:  Evaluate patient's clinical status/address deficits/provide  appropriate therapeutic treatment (e.g., cognition, sensation, range of motion, muscle strength, balance)  Assess/recommend assistive technology and/or environmental modifications in preparation  for discharge  Train/reinforce ordered/applicable positioning restrictions and/or orthosis use     Prob Intervention Frequency Start Date End Date    Prevent/Manage DVT/VTE Risk Per CPG 03/23/18 --    Intervention Details:  Assess for VTE risk  Encourage/assist with early ambulation  Initiate/maintain compression therapy  Encourage both active and passive leg exercises while in bed, if unable to ambulate     Prob Intervention Frequency Start Date End Date    Support Psychosocial Response to Surgery Per CPG 03/23/18 --    Intervention Details:  Assess/monitor patient and/or support system perspective on quality of life/personal well-being; being mindful of developmental stage  Assist with establishing realistic recovery expectations  Evaluate/discuss signs and symptoms of psychosocial concerns  as a result of surgery  Acknowledge, validate, normalize potential life-long/temporary lifestyle adjustments (e.g., activity restrictions/precautions, dependence on others)           Problem: Functional Mobility, Impaired (Adult)  Start Date: 03/24/18    Goal Start Date End Date    Functional Mobility Fxn [place] 03/24/18 --    Prob Intervention Frequency Start Date End Date    Promote Safe Transfers (Functional Mobility, Impaired) Per CPG 03/24/18 --    Intervention Details:  Promote transfer safety awareness (e.g., when to use transfer assist devices, when to ask for assistance in relation to situation, environment, risk of falling, level of fatigue)  Promote proper body mechanics/ergonomic transfer techniques     Prob Intervention Frequency Start Date End Date    Promote Safe Wheelchair Use Per CPG 03/24/18 --    Intervention Details:  Determine most appropriate method for wheelchair use based upon functional skill  levels  Facilitate wheelchair management training for safe wheelchair use (e.g., locking/unlocking brakes, swing-away footrests/elevating leg rests, removable arm rests)     Prob Intervention Frequency Start Date End Date    Promote Effective Direction of Care Per CPG 03/24/18 --    Intervention Details:  Facilitate effective communication strategies for directing elements of care.  Promote focus on primary, important elements of care delivery.  Encourage patient to speak up/halt improper or unsafe elements of care delivery.  Promote ability to redirect c aregiver, safe/proper care delivery.     Prob Intervention Frequency Start Date End Date    Promote Skin Integrity/Management Per CPG 03/24/18 --    Intervention Details:  Identify specific risks to skin integrity and promote daily skin inspection with special attention to high risk areas and use of mirror/adaptive equipment  Develop an individualized positioning/seating program utilizing positioning/pressure-relieving dev ices for support/protection  Determine most appropriate weight shifting strategies (e.g., pushup, lateral/anterior/shift, tilt back) and implement schedule     Prob Intervention Frequency Start Date End Date    Promote Use of Energy Conservation/Work Simplification Techniques Per CPG 03/24/18 --    Intervention Details:  Promote energy conservation: work simplification/adapted performance/modified timing of activity/use of adaptive equipment           Problem: Physical Therapy Goals (Impaired Functional Mobility)  Start Date: 03/24/18    Goal Start Date End Date    Bed-Chair Transfer Goals, PT 03/24/18 --    Goal Intervention Frequency Start Date End Date    Promote Safe Transfers (Bed-Chair Transfer Goals, PT) Per CPG 03/24/18 --    Intervention Details:  Promote transfer safety awareness (e.g., when to use transfer assist devices, when to ask for assistance in relation to situation, environment, risk of falling, level of fatigue)  Promote  proper body mechanics/ergonomic transfer techniques     Goal Start Date End Date    Stairs Goal 03/24/18 --    Goal Start Date End Date    Walking/Gait Goals 03/24/18 --    Goal Start Date End Date    Wheelchair Goals, PT 03/24/18 --    Goal Intervention Frequency Start Date End Date    Promote Safe Wheelchair Use Per CPG 03/24/18 --    Intervention Details:  Determine most appropriate method for wheelchair use based upon functional skill levels  Facilitate wheelchair management training for safe wheelchair use (e.g., locking/unlocking brakes, swing-away footrests/elevating leg rests, removable arm rests)     Goal Start Date End Date    Additional Goals, PT 03/24/18 --          Problem: Occupational Therapy Goals (BADL Skill Impairment)  Start Date: 03/24/18    Goal Start Date End Date    Bathing Goals 03/24/18 --    Goal Intervention Frequency Start Date End Date    Promote Bathing [place] Per CPG 03/24/18 --    Intervention Details:  Restorative, compensatory, or adaptive techniques/training to restore optimal independence for bathing tasks.  Proper/safe use of assistive devices/adaptive equipment.  Schedule bathing tasks at optimal times to avoid fatigue and impact of medications.   Maintain functional position during bathing tasks.  Safe/effective soaping/rinsing/drying of all body areas.     Goal Start Date End Date    Dressing Goals, Lower Body 03/24/18 --    Goal Intervention Frequency Start Date End Date    Promote Dressing [place] (Lower Body) Per CPG 03/24/18 --    Intervention Details:  Restorative, compensatory, or adaptive techniques/training to restore optimal independence for upper/lower body dressing tasks.  Proper/safe use of assistive devices/adaptive equipment.  Schedule dressing tasks at optimal times to avoid fatigue and impac t of medications.  Maintain functional position during dressing tasks.  Safe/effective donning/doffing of clothing, management of fasteners.     Goal Start Date  End Date    Dressing Goals, Upper Body 03/24/18 --    Goal Intervention Frequency Start Date End Date    Promote Dressing [place] (Upper Body) Per CPG 03/24/18 --    Intervention Details:  Restorative, compensatory, or adaptive techniques/training to restore optimal independence for upper/lower body dressing tasks.  Proper/safe use of assistive devices/adaptive equipment.  Schedule dressing tasks at optimal times to avoid fatigue and impac t of medications.  Maintain functional position during dressing tasks.  Safe/effective donning/doffing of clothing, management of fasteners.     Goal Start Date End Date    CPM S16 GIRF GROOMING GOALS.GIRF GROOMING UB 03/24/18 --    Goal Intervention Frequency Start Date End Date    Promote Grooming [place] Per CPG 03/24/18 --    Intervention Details:  Provide specific training to restore optimal level of independence through restorative, compensatory or adaptive techniques for grooming tasks  Promote proper/safe use of assistive devices/adaptive equipment for grooming tasks  Encourage scheduling of gr ooming activities at optimal times to avoid fatigue and impact of medications  Promote maintenance of functional position during grooming activities  Promote effective washing/drying of hair  Promote effective brushing/combing/styling of hair  Promote ef fective oral hygiene activities  Promote effective shaving/trimming hair  Promote effective application of makeup when appropriate     Goal Start Date End Date    Toileting Goals 03/24/18 --    Goal Intervention Frequency Start Date End Date    Promote Toileting [place] Per CPG 03/24/18 --    Intervention Details:  Restore optimal level of toileting independence through restorative/compensatory/adaptive techniques for all toileting tasks  Promote proper/safe use of assistive devices/adaptive equipment  Schedule toileting activities at optimal times to avoid fatigue  and impact of medications           Problem:  Occupational Therapy Goals (Impaired Functional Mobility)  Start Date: 03/24/18    Goal Start Date End Date    Toilet Transfer Goals, OT 03/24/18 --    Goal Intervention Frequency Start Date End Date    Promote Safe Transfers (Toilet Transfer Goals, OT) Per CPG 03/24/18 --    Intervention Details:  Promote transfer safety awareness (e.g., when to use transfer assist devices, when to ask for assistance in relation to situation, environment, risk of falling, level of fatigue)  Promote proper body mechanics/ergonomic transfer techniques     Goal Start Date End Date    Tub-Shower Transfer Goals, OT 03/24/18 --    Goal Intervention Frequency Start Date End Date    Promote Safe Transfers (Tub-Shower Transfer Goals, OT) Per [facility] 03/24/18 --    Intervention Details:  Promote transfer safety awareness (e.g., when to use transfer assist devices, when to ask for assistance in relation to situation, environment, risk of falling, level of fatigue)  Promote proper body mechanics/ergonomic transfer techniques                         Discharge Planning:  Expected Level of Function at Discharge  Expected Functional Improvement: mobility, safety, self-care  Self-Care: Independent  Sphincter Control: Independent  Transfers: Independent  Locomotion: Independent  Communication: Independent  Social Cognition: Independent  DC Needs Assessment  Concerns To Be Addressed: care coordination/care conferences  Discharge Planning  Current Home Care Services?:  (2 NILE, [facility], [facility])    Anticipated Discharge Date: 3/30/2018    Needs Identified:         Team Members Present     Rehab Attending Present:  Robert Luo MD    Care Coordinator Present:  LUCY Riddle    Nurse Present:  May Hutchins RN    OT Present:  Nicol Daniels OT    PT Present:  Janae Junior PT    Psychologist Present:  Jesika Skaggs, PhD        Next Team Conference Date: 03/27/18

## 2018-03-27 NOTE — PLAN OF CARE
Problem: Physical Therapy Goals (Impaired Functional Mobility)  Goal: Bed-Chair Transfer Goals, PT  Outcome: Ongoing (interventions implemented as appropriate)    Goal: Walking/Gait Goals  Outcome: Ongoing (interventions implemented as appropriate)    Goal: Additional Goals, PT  Outcome: Ongoing (interventions implemented as appropriate)

## 2018-03-27 NOTE — PLAN OF CARE
Problem: Patient Care Overview  Goal: Plan of Care Status/Review  Outcome: Ongoing (interventions implemented as appropriate)  Pt. Participated in supine and seated Te's, gait training and stair management training. Progressing well.

## 2018-03-27 NOTE — PLAN OF CARE
Problem: Patient Care Overview  Goal: Plan of Care Status/Review  Outcome: Ongoing (interventions implemented as appropriate)   03/27/18 1513   Patient Care Overview   IRF Plan of Care Status progress ongoing, continue   Progress, Functional Goals demonstrating adequate progress   Coping/Psychosocial   Plan Of Care Reviewed With patient   Pt. participated in OT session today and completed BUE strengthening (resistive red band) while seated on EOM and/or standing. (10 reps, 2 sets). Pt. also peformed knee flex/ext, plantar flex/dorsi flex 10 reps, 1 set while seated at EOM. CL S functional mobility with walker (50 feet). Pt. will continue to benefit from ongonig strengthening and ROM exercises to increase strength and endurance for functional activities. Cont. with POC.

## 2018-03-27 NOTE — SUBJECTIVE & OBJECTIVE
Patient was seen and examined at the bedside  Attestation Notes: Face to face encounter completed    Subjective     Interval History: none.. there were no overnight issues reported.   History also provided by: nursing  Patient reports doing well overnight, sleeping well  Pain is better controlled today  No increased pain specifically in the left calf  He is moving his bowels now without difficulty  Appetite is good  No chest pain shortness of breath or palpitations  No cough  No abdominal pain, no nausea or vomiting  No dysuria urgency or frequency    Objective     Vital signs in last 24 hours:  Temp:  [37 °C (98.6 °F)] 37 °C (98.6 °F)  Heart Rate:  [77-79] 77  Resp:  [16] 16  BP: (112-121)/(60-72) 121/68    No intake or output data in the 24 hours ending 03/27/18 1340  Intake/Output this shift:  No intake/output data recorded.    Review of Systems:  Pertinent items are noted in HPI.    Labs  No new labs.    Imaging  Not applicable    Current Facility-Administered Medications   Medication Dose Route Frequency   • acetaminophen  650 mg oral q4h PRN   • albuterol  2.5 mg nebulization q6h PRN   • alum-mag hydroxide-simeth  30 mL oral q6h PRN   • aspirin  325 mg oral Daily   • bisacodyl  10 mg rectal Daily PRN   • celecoxib  200 mg oral Daily   • cephalexin  500 mg oral QID   • citalopram  40 mg oral Daily   • glucose  16-32 g of dextrose oral PRN    Or   • dextrose  15-30 g of dextrose oral PRN    Or   • glucagon  1 mg intramuscular PRN    Or   • dextrose in water  25 mL intravenous PRN   • ezetimibe  10 mg oral Nightly    And   • simvastatin  80 mg oral Nightly   • levothyroxine  88 mcg oral Daily (6:30a)   • lidocaine  2 patch Topical Daily   • lisinopril  1.25 mg oral Daily   • magnesium hydroxide  30 mL oral 2x daily PRN   • montelukast  10 mg oral Nightly   • ondansetron ODT  4 mg oral q8h PRN   • oxyCODONE  5-10 mg oral q4h PRN   • pantoprazole  40 mg oral Daily before breakfast   • polyethylene glycol  17 g oral  Daily PRN   • pregabalin  50 mg oral BID   • sennosides-docusate sodium  1 tablet oral BID       VTE Assessment: aspirin 325mg daily     Full Code    Physical Exam  General: NAD, resting comfortably in bed  HEENT: PERRLA, EOMI, moist mucous membranes  Neck: Supple, no lymphadenopathy  Heart: Positive S1, S2, regular, no murmurs heard  Lungs: Clear to excision bilaterally no wheezes no crackles no rales  Abdomen: Soft, nontender, nondistended, positive bowel sounds  Extremities: Bilateral knee incisions are healing well minimal edema noted in his knees no edema noted in the lower part of his legs bilaterally  Neuro: Awake and alert, oriented ×3, no focal deficits      Plan of care was discussed with patient, nurse    62-year-old male with a past medical history of asthma, depression, DM, hypothyroidism, hyperlipidemia, proteinuria, and OA/DJD presents for comprehensive rehab therapy after bilateral TKA

## 2018-03-27 NOTE — PROGRESS NOTES
Patient: Carl Sessions  Location: [facility] [address]  MRN: [id number]  Today's date: 3/27/2018          Pain/Vitals - 03/27/18 1543        Pain/Comfort/Sleep    Presence of Pain complains of pain/discomfort    Preferred Pain Scale number (Numeric Rating Pain Scale)    Pain Body Location - Side Bilateral    Pain Rating (0-10): Rest 7    Pain Rating (0-10): Activity 7    Pain Management Interventions premedicated for activity       Vital Signs    Pulse 80    /65          Prior Living Environment  Lives With: spouse  Living Arrangements: house  Home Accessibility: stairs to enter home, other (see comments)  Living Environment Comment: Pt's 2 story home has 1+1 NILE, Bed and full bath on 2nd flr., 1/2 bath on 1st,  2nd floor bath has a small built-in corner bench.  DME; RWalker, Power scooter, tub seat, reacher, long handled shoe horn, sock thomas, dressing stick, and long handled sponge.     Location, Bathroom: second floor, must negotiate stairs to access  Bathroom Access Comment: owns RTS + rails, will [facility] shower chair + [facility], owns grab bars in shower stall on 2nd floor     Prior Level of Function  Ambulation: independent  Transferring: independent  Toileting: independent  Bathing: independent  Dressing: independent  Eating: independent  Communication: understands/communicates without difficulty  Swallowing: swallows foods/liquids without difficulty  Prior Functional Level Comment:  (has shower bench,RTS, RW, getting grab bars)Dominant Hand: left          PT Treatment Summary - 03/27/18 0115        Session Details    Document Type daily treatment    Mode of Treatment group therapy;physical therapy       Time Calculation    Start Time 1500    Stop Time 1600    Time Calculation (min) 60 min       ROM: Left Knee    PROM Deficit: Extension/Flexion 0-112       ROM: Right Knee    PROM Deficit: Extension/Flexion 0-116       Bed Chair WC Transfer Training    Assistive Device  (Transfers) walker, front-wheeled    Sit-Stand Transfers, [place] supervision    Stand-Sit Transfers, [place] supervision    Low Pivot Transfers, [place] supervision    Stand Pivot Transfers, [place] supervision    Supine to Sit, [place] supervision    Sit to Supine, [place] supervision       Gait Training    [place] supervision    Assistive Device walker, front-wheeled    Distance in Feet 200 feet    Comment and 50 ft x2        LE Supine Therapeutic Exercise    Exercise(s) Performed bridging (bilateral w/bolster);heel slides (hip/knee flexion/extension);ankle pumps;gluteal sets;quadriceps sets;SAQ (short arc quad) over bolster    Sets/Reps Detail 3x10       PT Clinical Impression    Daily Outcome Statement good comfort of supine Latasha. Making appropriate gains with B knee ROM. Pt does not appear to be limited by pain.                         PT Care Plan Goals    Flowsheet Row Most Recent Value   Stair Goal, PT   PT STG: Stairs  minimum assist (75% or less patient effort)   STG Number of Stairs  8   Physical Therapy STG Date Established: Stairs  03/24/18   PT STG Duration: Stairs  5 days or less   PT STG Outcome: Stairs  goal ongoing   PT LTG: Stairs  modified independence   LTG Number of Stairs  12   Physical Therapy LTG Date Established: Stairs  03/24/18   PT LTG Duration: Stairs  21 days or less   Physical Therapy LTG Date Reviewed: Stairs  03/27/18   PT LTG Outcome: Stairs  goal ongoing   Walking Locomotion Goal, PT   PT STG: Walking Locomotion  supervision required, verbal cues required   Physical Therapy STG Date Established: Walking Locomotion  03/24/18 [Distances > than or = to 175'.]   PT STG Duration: Walking Locomotion  5 days or less   Physical Therapy STG Date Reviewed: Walking Locomotion  03/27/18   PT STG Outcome: Walking Locomotion  goal ongoing   PT LTG: Walking Locomotion  modified independence   Physical Therapy LTG Date Established: Walking Locomotion  03/24/18  [Distances > than or = to 300'.]   PT LTG Duration: Walking Locomotion  14 days or less   Physical Therapy LTG Date Reviewed: Walking Locomotion  03/27/18   PT LTG Outcome: Walking Locomotion  goal ongoing   Additional Goal, PT   Physical Therapy STG: Date Established  03/24/18 [Pt. will increase B Knee PROM by > 10 degrees.]   PT STG Duration  5 days or less   Physical Therapy STG: Date Reviewed  03/27/18   PT STG Outcome  goal ongoing   Physical Therapy LTG: Date Established  03/24/18 [Pt. will increase B Knee PROM to > or = to 0-115 degrees.]   PT LTG Duration  21 days or less   Physical Therapy LTG: Date Reviewed  03/27/18   PT LTG Outcome  goal ongoing

## 2018-03-27 NOTE — ASSESSMENT & PLAN NOTE
Left Tibial vein thrombosis will repeat dopplers at the end of the week  Patient has no pain in his leg  Maintain full dose ASA

## 2018-03-27 NOTE — PROGRESS NOTES
Daily Progress Note    Patient was seen and examined at the bedside  Attestation Notes: Face to face encounter completed    Subjective     Interval History: none.. there were no overnight issues reported.   History also provided by: nursing  Patient reports doing well overnight, sleeping well  Pain is better controlled today  No increased pain specifically in the left calf  He is moving his bowels now without difficulty  Appetite is good  No chest pain shortness of breath or palpitations  No cough  No abdominal pain, no nausea or vomiting  No dysuria urgency or frequency    Objective     Vital signs in last 24 hours:  Temp:  [37 °C (98.6 °F)] 37 °C (98.6 °F)  Heart Rate:  [77-79] 77  Resp:  [16] 16  BP: (112-121)/(60-72) 121/68    No intake or output data in the 24 hours ending 03/27/18 1340  Intake/Output this shift:  No intake/output data recorded.    Review of Systems:  Pertinent items are noted in HPI.    Labs  No new labs.    Imaging  Not applicable    Current Facility-Administered Medications   Medication Dose Route Frequency   • acetaminophen  650 mg oral q4h PRN   • albuterol  2.5 mg nebulization q6h PRN   • alum-mag hydroxide-simeth  30 mL oral q6h PRN   • aspirin  325 mg oral Daily   • bisacodyl  10 mg rectal Daily PRN   • celecoxib  200 mg oral Daily   • cephalexin  500 mg oral QID   • citalopram  40 mg oral Daily   • glucose  16-32 g of dextrose oral PRN    Or   • dextrose  15-30 g of dextrose oral PRN    Or   • glucagon  1 mg intramuscular PRN    Or   • dextrose in water  25 mL intravenous PRN   • ezetimibe  10 mg oral Nightly    And   • simvastatin  80 mg oral Nightly   • levothyroxine  88 mcg oral Daily (6:30a)   • lidocaine  2 patch Topical Daily   • lisinopril  1.25 mg oral Daily   • magnesium hydroxide  30 mL oral 2x daily PRN   • montelukast  10 mg oral Nightly   • ondansetron ODT  4 mg oral q8h PRN   • oxyCODONE  5-10 mg oral q4h PRN   • pantoprazole  40 mg oral Daily before breakfast   •  polyethylene glycol  17 g oral Daily PRN   • pregabalin  50 mg oral BID   • sennosides-docusate sodium  1 tablet oral BID       VTE Assessment: aspirin 325mg daily     Full Code    Physical Exam  General: NAD, resting comfortably in bed  HEENT: PERRLA, EOMI, moist mucous membranes  Neck: Supple, no lymphadenopathy  Heart: Positive S1, S2, regular, no murmurs heard  Lungs: Clear to excision bilaterally no wheezes no crackles no rales  Abdomen: Soft, nontender, nondistended, positive bowel sounds  Extremities: Bilateral knee incisions are healing well minimal edema noted in his knees no edema noted in the lower part of his legs bilaterally  Neuro: Awake and alert, oriented ×3, no focal deficits      Plan of care was discussed with patient, nurse    62-year-old male with a past medical history of asthma, depression, DM, hypothyroidism, hyperlipidemia, proteinuria, and OA/DJD presents for comprehensive rehab therapy after bilateral TKA    Assessment & Plan  * S/P total knee arthroplasty, bilateral   Assessment & Plan    Done by Dr. Neff at [facility] on 3/19/2018  Continue to work with PT/OT        At moderate risk for deep venous thrombosis   Assessment & Plan    Left Tibial vein thrombosis will repeat dopplers at the end of the week  Patient has no pain in his leg  Maintain full dose ASA        Depression   Assessment & Plan    Stable on Celexa daily        Postoperative pain   Assessment & Plan    Controlled with Celebrex, Lyrica, oxycodone, Tylenol        Proteinuria   Assessment & Plan    On a small dose of lisinopril 1.25 mg        Mild intermittent asthma   Assessment & Plan    Stable at this time nebs ordered as needed        DM2 (diabetes mellitus, type 2) (CMS/[facility]) ([facility])   Assessment & Plan    Sugars are currently well controlled  He is not on any medications for diabetes at this time        Hypothyroid   Assessment & Plan    Continue Synthroid daily        Mixed hyperlipidemia   Assessment & Plan     Wife will bring in his Vytorin from home, he would prefer to take his own once she does bring it in        Bilateral lower extremity edema   Assessment & Plan    Lower extremity Doppler done revealed a Left Tibial vein thrombosis  this is a distal clot okay to monitor and recheck Dopplers at the end of the week  Continue aspirin 325 mg daily  He has no shortness of breath or chest pain        Anemia due to blood loss   Assessment & Plan    Monitor hemoglobin at this time  Has remained stable around 12            Expected Discharge Date:  3/30/2018

## 2018-03-27 NOTE — ASSESSMENT & PLAN NOTE
Oxycodone as needed for pain control.  Tylenol as needed.  Continue Celebrex.  Continue Lyrica 50 mg twice a day.  Added lidocaine patches with improvement.

## 2018-03-27 NOTE — ASSESSMENT & PLAN NOTE
Done by Dr. Neff at [facility] on 3/19/2018  Continue to work with PT/OT

## 2018-03-27 NOTE — PROGRESS NOTES
Pt teamed today and elos Fri 3/30 to home with wife.  [facility] to be faxed to her.[phone] and fax [phone].  CM was not aware update needed and spoke with Yady.  Faxed her update and made her aware of the elos.  Met with the pt to review team and he had hoped he would be here longer and explained reason for elos and doing very well and he agrees with elos.  He states transport home and [facility]care services [facility] through the [facility] and contact Yady or [facility] [phone].  Pt aware primary CM will return tomorrow and can f/u on aftercare services per surgeon protocol.  No firther issues noted...Lulú Mcmahon MSW LSW

## 2018-03-27 NOTE — ASSESSMENT & PLAN NOTE
Bilateral total knee arthroplasties by Kishore Choe MD of orthopedic surgery at [facility] on March 19, 2018.  Patient tolerated surgery well.       Weight-bear as tolerated post surgery.    Complete empiric Keflex.    Apprehensive inpatient rehabilitation program to address functional deficits status post bilateral knee replacements.  Goals of modified independent to supervision with mobility self-care activities and return home.    The patient is tolerating and improving with therapies well.  Iimproving with mobility and self-care activities.  ROM improving.

## 2018-03-27 NOTE — SUBJECTIVE & OBJECTIVE
"   Patient was seen and examined.   Attestation Notes: Face to face encounter completed    Subjective    Pain fair control.  Progressing in therapies.  Objective     /72   Pulse 79   Temp 37 °C (98.6 °F)   Resp 16   Ht 1.676 m (5' 6\")   Wt 94.1 kg (207 lb 6.4 oz)   SpO2 97%   BMI 33.48 kg/m²     No intake or output data in the 24 hours ending 03/11/18 0953  Intake/Output this shift:  No intake/output data recorded.    Review of Systems:  Pertinent items are noted in HPI.  Denies chest pain and shortness of breath.  Review of systems otherwise negative.    Labs     Results from last 7 days  Lab Units 03/26/18  0619   WBC K/uL 7.45   HEMOGLOBIN g/dL 12.5*   HEMATOCRIT % 37.3*   PLATELETS K/uL 267       Results from last 7 days  Lab Units 03/26/18  0619   SODIUM mmol/L 135*   POTASSIUM mmol/L 4.3   CHLORIDE mmol/L 99   CO2 mmol/L 32   BUN mg/dL 20   CREATININE mg/dL 0.8   CALCIUM mg/dL 8.9   ALBUMIN g/dL 3.5   BILIRUBIN TOTAL mg/dL 1.5*   ALK PHOS IU/L 118   ALT IU/L 34   AST IU/L 20   GLUCOSE mg/dL 89         Imaging  Not applicable    Full Code    Physical Exam  General      Alert, cooperative, no distress, appears stated age.   Head:    Normocephalic, without obvious abnormality, atraumatic.   Eyes:    PERRL, conjunctiva/corneas clear, EOM's intact.        Nose:   Nares normal, septum midline, mucosa normal, no drainage or            sinus tenderness.   Throat:   Lips, mucosa, and tongue normal.    Neck:   Supple, symmetrical, trachea midline.    Back:     Symmetric, no curvature.   Lungs:     Clear to auscultation bilaterally, respirations unlabored.   Chest wall:    No tenderness or deformity.   Heart:    Regular rate and rhythm, S1 and S2 normal.   Abdomen:     Soft, non-tender, bowel sounds active all four quadrants,     no masses, no organomegaly.   Extremities:  Musculoskeletal:  1+ lower extremity edema bilaterally.   Bilateral total knee arthroplasties.      Pulses:   1+ and symmetric all " extremities.   Skin:   Incisions healing well   Neurologic:          Behavior/  Emotional:  CNII-XII intact.  Alert and oriented ×3.  Motor exam improving quad control  Sensory exam intact.  Reflexes stable decreased reflexes.      Appropriate, cooperative           Plan of care was discussed with patient

## 2018-03-27 NOTE — ASSESSMENT & PLAN NOTE
Wife will bring in his Vytorin from home, he would prefer to take his own once she does bring it in

## 2018-03-28 ENCOUNTER — APPOINTMENT (INPATIENT)
Dept: PHYSICAL THERAPY | Facility: REHABILITATION | Age: 63
DRG: 560 | End: 2018-03-28
Payer: OTHER GOVERNMENT

## 2018-03-28 ENCOUNTER — APPOINTMENT (INPATIENT)
Dept: OCCUPATIONAL THERAPY | Facility: REHABILITATION | Age: 63
DRG: 560 | End: 2018-03-28
Payer: OTHER GOVERNMENT

## 2018-03-28 PROCEDURE — 97150 GROUP THERAPEUTIC PROCEDURES: CPT | Mod: GP

## 2018-03-28 PROCEDURE — 97116 GAIT TRAINING THERAPY: CPT | Mod: GP

## 2018-03-28 PROCEDURE — 12800000 HC ROOM AND CARE SEMIPRIVATE REHAB

## 2018-03-28 PROCEDURE — 97530 THERAPEUTIC ACTIVITIES: CPT | Mod: GO

## 2018-03-28 PROCEDURE — 63700000 HC SELF-ADMINISTRABLE DRUG: Performed by: PHYSICAL MEDICINE & REHABILITATION

## 2018-03-28 PROCEDURE — 63700000 HC SELF-ADMINISTRABLE DRUG: Performed by: HOSPITALIST

## 2018-03-28 PROCEDURE — 97110 THERAPEUTIC EXERCISES: CPT | Mod: GP

## 2018-03-28 RX ADMIN — OXYCODONE HYDROCHLORIDE 10 MG: 5 TABLET ORAL at 09:25

## 2018-03-28 RX ADMIN — ASPIRIN 325 MG: 325 TABLET, COATED ORAL at 07:59

## 2018-03-28 RX ADMIN — CEPHALEXIN 500 MG: 500 CAPSULE ORAL at 20:45

## 2018-03-28 RX ADMIN — CEPHALEXIN 500 MG: 500 CAPSULE ORAL at 08:00

## 2018-03-28 RX ADMIN — OXYCODONE HYDROCHLORIDE 5 MG: 5 TABLET ORAL at 13:40

## 2018-03-28 RX ADMIN — CEPHALEXIN 500 MG: 500 CAPSULE ORAL at 18:02

## 2018-03-28 RX ADMIN — PANTOPRAZOLE SODIUM 40 MG: 40 TABLET, DELAYED RELEASE ORAL at 08:03

## 2018-03-28 RX ADMIN — SENNOSIDES AND DOCUSATE SODIUM 1 TABLET: 8.6; 5 TABLET ORAL at 08:04

## 2018-03-28 RX ADMIN — CEPHALEXIN 500 MG: 500 CAPSULE ORAL at 12:13

## 2018-03-28 RX ADMIN — OXYCODONE HYDROCHLORIDE 5 MG: 5 TABLET ORAL at 01:32

## 2018-03-28 RX ADMIN — OXYCODONE HYDROCHLORIDE 10 MG: 5 TABLET ORAL at 05:27

## 2018-03-28 RX ADMIN — SENNOSIDES AND DOCUSATE SODIUM 1 TABLET: 8.6; 5 TABLET ORAL at 20:47

## 2018-03-28 RX ADMIN — ACETAMINOPHEN 650 MG: 325 TABLET, FILM COATED ORAL at 20:51

## 2018-03-28 RX ADMIN — PREGABALIN 50 MG: 50 CAPSULE ORAL at 20:50

## 2018-03-28 RX ADMIN — PREGABALIN 50 MG: 50 CAPSULE ORAL at 08:06

## 2018-03-28 RX ADMIN — LEVOTHYROXINE SODIUM 88 MCG: 88 TABLET ORAL at 05:28

## 2018-03-28 RX ADMIN — CITALOPRAM HYDROBROMIDE 40 MG: 20 TABLET, FILM COATED ORAL at 08:00

## 2018-03-28 RX ADMIN — LIDOCAINE 2 PATCH: 246 PATCH TOPICAL at 08:00

## 2018-03-28 RX ADMIN — CELECOXIB 200 MG: 200 CAPSULE ORAL at 07:59

## 2018-03-28 RX ADMIN — EZETIMIBE 10 MG: 10 TABLET ORAL at 20:46

## 2018-03-28 RX ADMIN — SIMVASTATIN 80 MG: 40 TABLET, FILM COATED ORAL at 20:47

## 2018-03-28 RX ADMIN — MONTELUKAST SODIUM 10 MG: 10 TABLET, FILM COATED ORAL at 20:47

## 2018-03-28 RX ADMIN — LISINOPRIL 1.25 MG: 2.5 TABLET ORAL at 08:02

## 2018-03-28 NOTE — DISCHARGE INSTR - APPOINTMENTS
Home care arranged through [facility]      RN and Pt.. They will call to schedule visits

## 2018-03-28 NOTE — SUBJECTIVE & OBJECTIVE
Patient was seen and examined at the bedside   Attestation Notes: Face to face encounter completed    Subjective     Interval History: none.. there were no overnight issues reported.   History also provided by: nursing  Patient reports increased pain in both knees this morning but worse on the left  Pain improved during the morning and was better at the time of my exam after lunch  Reports sleeping well, appetite is good  No chest pain shortness of breath or cough  No abdominal pain, no nausea vomiting diarrhea constipation  No dysuria urgency or frequency      Objective     Vital signs in last 24 hours:  Temp:  [36.8 °C (98.3 °F)-37.1 °C (98.7 °F)] 37 °C (98.6 °F)  Heart Rate:  [67-84] 84  Resp:  [18-20] 18  BP: (101-126)/(63-72) 117/63    No intake or output data in the 24 hours ending 03/28/18 1315  Intake/Output this shift:  No intake/output data recorded.    Review of Systems:  Pertinent items are noted in HPI.    Labs  No new labs.    Imaging  Not applicable    Current Facility-Administered Medications   Medication Dose Route Frequency   • acetaminophen  650 mg oral q4h PRN   • albuterol  2.5 mg nebulization q6h PRN   • alum-mag hydroxide-simeth  30 mL oral q6h PRN   • aspirin  325 mg oral Daily   • bisacodyl  10 mg rectal Daily PRN   • celecoxib  200 mg oral Daily   • cephalexin  500 mg oral QID   • citalopram  40 mg oral Daily   • glucose  16-32 g of dextrose oral PRN    Or   • dextrose  15-30 g of dextrose oral PRN    Or   • glucagon  1 mg intramuscular PRN    Or   • dextrose in water  25 mL intravenous PRN   • ezetimibe  10 mg oral Nightly    And   • simvastatin  80 mg oral Nightly   • levothyroxine  88 mcg oral Daily (6:30a)   • lidocaine  2 patch Topical Daily   • lisinopril  1.25 mg oral Daily   • magnesium hydroxide  30 mL oral 2x daily PRN   • montelukast  10 mg oral Nightly   • ondansetron ODT  4 mg oral q8h PRN   • oxyCODONE  5-10 mg oral q4h PRN   • pantoprazole  40 mg oral Daily before breakfast    • polyethylene glycol  17 g oral Daily PRN   • pregabalin  50 mg oral BID   • sennosides-docusate sodium  1 tablet oral BID         VTE Assessment: SCD    Full Code    Physical Exam  GEN: NAD, sitting at the side of his bed  HEENT: PERRL, EOMI, moist mucus membranes  Heart: Positive S1, S2, regular  Lungs: Clear to auscultation bilaterally, no wheezes no crackles no rales or rhonchi  Abdomen: Soft, nontender, nondistended, positive bowel sounds  Extremities: Mild swelling of both knees, incisions are healing well, no calf tenderness on my exam in either lower extremity  Neuro: Awake and alert, oriented ×3, no focal deficits    Plan of care was discussed with patient, nurse    8-year-old male with a past medical history of asthma, depression, DM, hyperlipidemia, proteinuria, OA/DJD presents for conference of rehab therapy after bilateral knee replacements

## 2018-03-28 NOTE — ASSESSMENT & PLAN NOTE
Oxycodone as needed for pain control.  Tylenol as needed.  Continue Celebrex.  Continue Lyrica 50 mg twice a day.  Added lidocaine patches with improvement.  Reduce medications at discharge.

## 2018-03-28 NOTE — SUBJECTIVE & OBJECTIVE
"   Patient was seen and examined.   Attestation Notes: Face to face encounter completed    Subjective    Patient is progressing well with therapies.  Range of motion and mobility and self-care activities improving steadily.  Pain slowly improving.  Objective     BP (!) 111/59 (BP Location: Right upper arm, Patient Position: Sitting)   Pulse 84   Temp 36.3 °C (97.3 °F) (Oral)   Resp 18   Ht 1.676 m (5' 6\")   Wt 94.1 kg (207 lb 6.4 oz)   SpO2 (!) 84%   BMI 33.48 kg/m²     No intake or output data in the 24 hours ending 03/11/18 0953  Intake/Output this shift:  No intake/output data recorded.    Review of Systems:  Pertinent items are noted in HPI.  Denies chest pain and shortness of breath.  Review of systems otherwise negative.    Labs     Results from last 7 days  Lab Units 03/26/18  0619   WBC K/uL 7.45   HEMOGLOBIN g/dL 12.5*   HEMATOCRIT % 37.3*   PLATELETS K/uL 267       Results from last 7 days  Lab Units 03/26/18  0619   SODIUM mmol/L 135*   POTASSIUM mmol/L 4.3   CHLORIDE mmol/L 99   CO2 mmol/L 32   BUN mg/dL 20   CREATININE mg/dL 0.8   CALCIUM mg/dL 8.9   ALBUMIN g/dL 3.5   BILIRUBIN TOTAL mg/dL 1.5*   ALK PHOS IU/L 118   ALT IU/L 34   AST IU/L 20   GLUCOSE mg/dL 89         Imaging  Not applicable    Full Code    Physical Exam  General      Alert, cooperative, no distress, appears stated age.   Head:    Normocephalic, without obvious abnormality, atraumatic.   Eyes:    PERRL, conjunctiva/corneas clear, EOM's intact.        Nose:   Nares normal, septum midline, mucosa normal, no drainage or            sinus tenderness.   Throat:   Lips, mucosa, and tongue normal.    Neck:   Supple, symmetrical, trachea midline.    Back:     Symmetric, no curvature.   Lungs:     Clear to auscultation bilaterally, respirations unlabored.   Chest wall:    No tenderness or deformity.   Heart:    Regular rate and rhythm, S1 and S2 normal.   Abdomen:     Soft, non-tender, bowel sounds active all four quadrants,     no " masses, no organomegaly.   Extremities:  Musculoskeletal:  1+ lower extremity edema bilaterally.   Bilateral total knee arthroplasties.      Pulses:   1+ and symmetric all extremities.   Skin:  Skin healing very well with incisions completely intact.   Neurologic:          Behavior/  Emotional:  CNII-XII intact.  Alert and oriented ×3.  Motor exam intact.  Sensory exam intact.  Reflexes stable decreased reflexes.      Appropriate, cooperative           Plan of care was discussed with patient

## 2018-03-28 NOTE — PLAN OF CARE
Problem: Patient Care Overview  Goal: Plan of Care Status/Review   03/28/18 1232   Patient Care Overview   IRF Plan of Care Status progress ongoing, continue   Progress, Functional Goals demonstrating adequate progress   Coping/Psychosocial   Plan Of Care Reviewed With patient   Pt progressing well with amb and exercise program

## 2018-03-28 NOTE — PROGRESS NOTES
Daily Progress Note    Patient was seen and examined at the bedside   Attestation Notes: Face to face encounter completed    Subjective     Interval History: none.. there were no overnight issues reported.   History also provided by: nursing  Patient reports increased pain in both knees this morning but worse on the left  Pain improved during the morning and was better at the time of my exam after lunch  Reports sleeping well, appetite is good  No chest pain shortness of breath or cough  No abdominal pain, no nausea vomiting diarrhea constipation  No dysuria urgency or frequency      Objective     Vital signs in last 24 hours:  Temp:  [36.8 °C (98.3 °F)-37.1 °C (98.7 °F)] 37 °C (98.6 °F)  Heart Rate:  [67-84] 84  Resp:  [18-20] 18  BP: (101-126)/(63-72) 117/63    No intake or output data in the 24 hours ending 03/28/18 1315  Intake/Output this shift:  No intake/output data recorded.    Review of Systems:  Pertinent items are noted in HPI.    Labs  No new labs.    Imaging  Not applicable    Current Facility-Administered Medications   Medication Dose Route Frequency   • acetaminophen  650 mg oral q4h PRN   • albuterol  2.5 mg nebulization q6h PRN   • alum-mag hydroxide-simeth  30 mL oral q6h PRN   • aspirin  325 mg oral Daily   • bisacodyl  10 mg rectal Daily PRN   • celecoxib  200 mg oral Daily   • cephalexin  500 mg oral QID   • citalopram  40 mg oral Daily   • glucose  16-32 g of dextrose oral PRN    Or   • dextrose  15-30 g of dextrose oral PRN    Or   • glucagon  1 mg intramuscular PRN    Or   • dextrose in water  25 mL intravenous PRN   • ezetimibe  10 mg oral Nightly    And   • simvastatin  80 mg oral Nightly   • levothyroxine  88 mcg oral Daily (6:30a)   • lidocaine  2 patch Topical Daily   • lisinopril  1.25 mg oral Daily   • magnesium hydroxide  30 mL oral 2x daily PRN   • montelukast  10 mg oral Nightly   • ondansetron ODT  4 mg oral q8h PRN   • oxyCODONE  5-10 mg oral q4h PRN   • pantoprazole  40 mg oral  Daily before breakfast   • polyethylene glycol  17 g oral Daily PRN   • pregabalin  50 mg oral BID   • sennosides-docusate sodium  1 tablet oral BID         VTE Assessment: SCD    Full Code    Physical Exam  GEN: NAD, sitting at the side of his bed  HEENT: PERRL, EOMI, moist mucus membranes  Heart: Positive S1, S2, regular  Lungs: Clear to auscultation bilaterally, no wheezes no crackles no rales or rhonchi  Abdomen: Soft, nontender, nondistended, positive bowel sounds  Extremities: Mild swelling of both knees, incisions are healing well, no calf tenderness on my exam in either lower extremity  Neuro: Awake and alert, oriented ×3, no focal deficits    Plan of care was discussed with patient, nurse    8-year-old male with a past medical history of asthma, depression, DM, hyperlipidemia, proteinuria, OA/DJD presents for conference of rehab therapy after bilateral knee replacements    Assessment & Plan  * S/P total knee arthroplasty, bilateral   Assessment & Plan    Done by Dr. Neff at [facility] on 3/19/2018  Continue to work with PT/OT        At moderate risk for deep venous thrombosis   Assessment & Plan    Repeat Doppler to be done tomorrow   Maintain full dose ASA        Depression   Assessment & Plan    Stable on Celexa daily        Postoperative pain   Assessment & Plan    Controlled with Celebrex, Lyrica, oxycodone, Tylenol        Proteinuria   Assessment & Plan    On a small dose of lisinopril 1.25 mg        Mild intermittent asthma   Assessment & Plan    Stable at this time nebs ordered as needed        DM2 (diabetes mellitus, type 2) (CMS/[facility]) ([facility])   Assessment & Plan    Sugars are currently well controlled  He is not on any medications for diabetes at this time        Hypothyroid   Assessment & Plan    Continue Synthroid daily        Mixed hyperlipidemia   Assessment & Plan    Continue Zetia and simvastatin  Will resume Vytorin upon discharge          Bilateral lower extremity edema    Assessment & Plan    Lower extremity Doppler done revealed a Left Tibial vein thrombosis  He is having more pain in his legs today however it is mainly around his knees  We will recheck Dopplers tomorrow to make sure the clot has not propagated  Continue aspirin 325 mg daily  He has no shortness of breath or chest pain        Anemia due to blood loss   Assessment & Plan    Monitor hemoglobin at this time  Has remained stable around 12            Expected Discharge Date:  3/30/2018

## 2018-03-28 NOTE — PROGRESS NOTES
Patient: Carl Sessions  Location: [facility] [address]  MRN: [id number]  Today's date: 3/28/2018          Pain/Vitals - 03/28/18 0815        Pain/Comfort/Sleep    Presence of Pain complains of pain/discomfort    Preferred Pain Scale number (Numeric Rating Pain Scale)    Pain Body Location - Side Bilateral    Pain Body Location knee    Pain Rating (0-10): Rest 4    Pain Rating (0-10): Activity 9    Pain Management Interventions premedicated for activity       Vital Signs    Pulse 84    /63          Prior Living Environment  Lives With: spouse  Living Arrangements: house  Home Accessibility: stairs to enter home, other (see comments)  Living Environment Comment: Pt's 2 story home has 1+1 NILE, Bed and full bath on 2nd flr., 1/2 bath on 1st,  2nd floor bath has a small built-in corner bench.  DME; RWalker, Power scooter, tub seat, reacher, long handled shoe horn, sock thomas, dressing stick, and long handled sponge.     Location, Bathroom: second floor, must negotiate stairs to access  Bathroom Access Comment: owns RTS + rails, will have shower chair + back [facility], owns grab bars in shower stall on 2nd floor     Prior Level of Function  Ambulation: independent  Transferring: independent  Toileting: independent  Bathing: independent  Dressing: independent  Eating: independent  Communication: understands/communicates without difficulty  Swallowing: swallows foods/liquids without difficulty  Prior Functional Level Comment:  (has shower bench,RTS, RW, getting grab bars)Dominant Hand: left          PT Treatment Summary - 03/28/18 0815        Session Details    Document Type daily treatment    Mode of Treatment individual therapy       Time Calculation    Start Time 0800    Stop Time 0900    Time Calculation (min) 60 min       Bed Chair WC Transfer Training    Assistive Device (Transfers) walker, front-wheeled    Bed-Chair Transfers, [place] modified independence    Chair-Bed  Transfers, [place] modified independence    Sit-Stand Transfers, [place] modified independence    Stand-Sit Transfers, [place] modified independence    Stand Pivot Transfers, [place] modified independence       Gait Analysis/Training    Gait/Stairs Locomotion Curb Management (Group)       Gait Training    [place] modified independence    Assistive Device walker, front-wheeled    Distance in Feet 400 feet    Gait Pattern Utilized step-through    Gait Deviations Identified decreased yanet;antalgic;decreased step length;decreased heel strike;decreased gait speed       Stairs Training    [place] supervision;verbal cues    Stairs, Assistive Device railing;cane, straight    Stairs, Handrail Location right side (ascending);left side (descending)    Number of Stairs 12    Stair Height 7 inches       Curb Management    [place] close supervision;verbal cues    Assistive Device walker, front-wheeled    Curb Height 6 inches       LE Seated Therapeutic Exercise    Exercise(s) Performed LAQ (long arc quad), knee extension;knee flexion       Aerobic Exercise Activity    Exercise(s) Performed stepper, recumbent with upper extremities    Exercise Type AROM (active range of motion)    Restrictions pain, decreased B knee ROM    Time 6    Ability to Transfer Skills transfers skills to functional activity most of the time       PT Clinical Impression    Daily Outcome Statement Pt has progressed to mod I level for transfers and amb with RW within pt room. Whiteboard updated and RN notified. Pt with instance of pain in L knee increased to 9/10 with amb, but subsided w/ change in activity.                         PT Care Plan Goals    Flowsheet Row Most Recent Value   Stair Goal, PT   PT STG: Stairs  minimum assist (75% or less patient effort)   STG Number of Stairs  8   Physical Therapy STG Date Established: Stairs  03/24/18   PT STG Duration: Stairs  5 days or less   PT STG Outcome: Stairs  goal  ongoing   PT LTG: Stairs  modified independence   LTG Number of Stairs  12   Physical Therapy LTG Date Established: Stairs  03/24/18   PT LTG Duration: Stairs  21 days or less   Physical Therapy LTG Date Reviewed: Stairs  03/27/18   PT LTG Outcome: Stairs  goal ongoing   Walking Locomotion Goal, PT   PT STG: Walking Locomotion  supervision required, verbal cues required   Physical Therapy STG Date Established: Walking Locomotion  03/24/18 [Distances > than or = to 175'.]   PT STG Duration: Walking Locomotion  5 days or less   Physical Therapy STG Date Reviewed: Walking Locomotion  03/27/18   PT STG Outcome: Walking Locomotion  goal ongoing   PT LTG: Walking Locomotion  modified independence   Physical Therapy LTG Date Established: Walking Locomotion  03/24/18 [Distances > than or = to 300'.]   PT LTG Duration: Walking Locomotion  14 days or less   Physical Therapy LTG Date Reviewed: Walking Locomotion  03/27/18   PT LTG Outcome: Walking Locomotion  goal ongoing   Additional Goal, PT   Physical Therapy STG: Date Established  03/24/18 [Pt. will increase B Knee PROM by > 10 degrees.]   PT STG Duration  5 days or less   Physical Therapy STG: Date Reviewed  03/27/18   PT STG Outcome  goal ongoing   Physical Therapy LTG: Date Established  03/24/18 [Pt. will increase B Knee PROM to > or = to 0-115 degrees.]   PT LTG Duration  21 days or less   Physical Therapy LTG: Date Reviewed  03/27/18   PT LTG Outcome  goal ongoing

## 2018-03-28 NOTE — ASSESSMENT & PLAN NOTE
Bilateral total knee arthroplasties by Kishore Choe MD of orthopedic surgery at [facility] on March 19, 2018.  Patient tolerated surgery well.       Weight-bear as tolerated post surgery.    Complete empiric Keflex.    Apprehensive inpatient rehabilitation program to address functional deficits status post bilateral knee replacements.  Goals of modified independent to supervision with mobility self-care activities and return home.    The patient continues to improve nicely and steadily with therapies with improvement in range of motion, self-care activities, and mobility.  Pain is slowly improving as well and controlled with current medications.

## 2018-03-28 NOTE — ASSESSMENT & PLAN NOTE
Done by Dr. Neff at [facility] on 3/19/2018  Continue to work with PT/OT

## 2018-03-28 NOTE — PROGRESS NOTES
Patient: Carl Sessions  Location: [facility] [address]  MRN: [id number]  Today's date: 3/28/2018         Prior Living Environment  Lives With: spouse  Living Arrangements: house  Home Accessibility: stairs to enter home, other (see comments)  Living Environment Comment: Pt's 2 story home has 1+1 NILE, Bed and full bath on 2nd flr., 1/2 bath on 1st,  2nd floor bath has a small built-in corner bench.  DME; RWalker, Power scooter, tub seat, reacher, long handled shoe horn, sock thomas, dressing stick, and long handled sponge.     Location, Bathroom: second floor, must negotiate stairs to access  Bathroom Access Comment: owns RTS + rails, will have shower chair + back [facility], owns grab bars in shower stall on 2nd floor     Prior Level of Function  Ambulation: independent  Transferring: independent  Toileting: independent  Bathing: independent  Dressing: independent  Eating: independent  Communication: understands/communicates without difficulty  Swallowing: swallows foods/liquids without difficulty  Prior Functional Level Comment:  (has shower bench,RTS, RW, getting grab bars)Dominant Hand: left          OT Treatment Summary - 03/28/18 1145        Session Details    Document Type daily treatment    Mode of Treatment occupational therapy;concurrent therapy       Time Calculation    Start Time 1030    Stop Time 1130    Time Calculation (min) 60 min       Bed Chair WC Transfer Training    Comment (Bed Mobility) standard bed; completed sit to stands from EOM Mod I s UE support; completed x15 reps at elevated EOM    Assistive Device (Transfers) walker, front-wheeled    Bed-Chair Transfers, [place] modified independence       Gait Training    [place] modified independence    Assistive Device walker, front-wheeled    Comment increased distance, main gym to [facility] gym at slow pace       Dynamic Standing Balance    [place], Reaches Outside Midline modified independence    Time  Able to Maintain Position, Reaches Outside Midline 1 to 2 minutes    Comment, Reaches Outside Midline item retrievel from floor with unilateral UE support for 10x each side; Cl S progressed to Mod I s/p demos approriate technique       UE Supine Therapeutic Exercise    Exercise(s) Performed shoulder flexion;shoulder abduction;elbow flexion;other (see comments);elbow extension   chest press     Device free weights, dumbbell    Exercise Type resistive exercise    Expected Outcomes improve functional tolerance, self-care activity;improve functional tolerance, community activity    Sets/Reps Detail 2x15 reps each side    Ability to Transfer Skills transfers skills to functional activity most of the time    Comment #7 for shoulder planes, #10 elbow planes       Aerobic Exercise Activity    Exercise(s) Performed arm bike    Exercise Type resistive exercise    Time 5    Ability to Transfer Skills transfers skills to functional activity most of the time    Comment standing       OT Clinical Impression    Daily Outcome Statement Reviewed plan for DME upon d/c to home; pt tolerated balance, ambulation and UE TE well this session. Demos safety with static and dynamic stading balance tasks. Approriate for ADL performance day next session                         OT Care Plan Goals    Flowsheet Row Most Recent Value   Bathing Goal, OT   OT STG: Bathing  modified independence   OT STG Date Established: Bathing  03/24/18   OT STG Duration: Bathing  7 days or less   OT STG Date Reviewed:  03/26/18   OT STG Outcome: Bathing  goal ongoing, goal revised this date   OT LTG: Bathing  modified independence   OT LTG Date Established: Bathing  03/24/18   OT LTG Duration: Bathing  14 days or less   OT LTG Date Reviewed: Bathing  03/26/18   OT LTG Outcome: Bathing  goal ongoing, goal revised this date   Grooming Goal, OT   OT STG: Grooming  modified independence   OT STG Date Established: Grooming  03/24/18   OT STG Duration: Grooming  7 days  or less   OT STG Date Reviewed: Grooming  03/26/18   OT STG Outcome: Grooming  goal revised this date, goal ongoing   OT LTG: Grooming  modified independence   OT LTG Date Established: Grooming  03/24/18   OT LTG Duration: Grooming  14 days or less   OT LTG Date Reviewed: Grooming  03/26/18   OT LTG Outcome: Grooming  goal revised this date, goal ongoing   Upper Body Dressing Goal, OT   OT STG: Upper Body Dressing  independent   Occupational Therapy STG Date Established: UB Dressing  03/24/18   OT STG Duration: Upper Body Dressing  7 days or less   Occupational Therapy STG Date Reviewed: UB Dressing  03/26/18   OT STG Outcome: Upper Body Dressing  goal ongoing, goal revised this date   OT LTG: Upper Body Dressing  modified independence   Occupational Therapy LTG Date Established: UB Dressing  03/24/18   OT LTG Duration: Upper Body Dressing  14 days or less   Occupational Therapy LTG Date Reviewed: UB Dressing  03/26/18   OT LTG Outcome: Upper Body Dressing  goal revised this date, goal ongoing   Lower Body Dressing Goal, OT   OT STG: Lower Body Dressing  modified independence   OT STG Date Established: LB Dressing  03/24/18   OT STG Duration: Lower Body Dressing  7 days or less   OT STG Date Reviewed: LB Dressing  03/26/18   OT STG Outcome: Lower Body Dressing  goal revised this date, goal ongoing   OT LTG: Lower Body Dressing  modified independence   OT LTG Date Established: LB Dressing  03/24/18   OT LTG Duration: Lower Body Dressing  14 days or less   OT LTG Date Reviewed: LB Dressing  03/26/18   OT LTG Outcome: Lower Body Dressing  goal revised this date, goal ongoing   Toilet Transfer Goal, OT   OT STG: Toilet Transfer  modified independence   Occupational Therapy STG Date Established: Toilet Transfer  03/24/18   OT STG Duration: Toilet Transfer  7 days or less   Occupational Therapy STG Date Reviewed: Toilet Transfer  03/26/18   OT STG Outcome: Toilet Transfer  goal revised this date, goal ongoing   OT LTG:  Toilet Transfer  modified independence   Occupational Therapy LTG Date Established: Toilet Transfer  03/24/18   OT LTG Duration: Toilet Transfer  14 days or less   Occupational Therapy LTG Date Reviewed: Toilet Transfer  03/26/18   OT LTG Outcome: Toilet Transfer  goal revised this date, goal ongoing   Toileting Goal, OT   OT STG: Toileting  modified independence   OT STG Date Established: Toileting  03/24/18   OT STG Duration: Toileting  7 days or less   OT STG Date Reviewed: Toileting  03/26/18   OT STG Outcome: Toileting  goal revised this date, goal ongoing   OT LTG: Toileting  modified independence   OT LTG Date Established: Toileting  03/24/18   OT LTG Duration: Toileting  14 days or less   OT LTG Date Reviewed: Toileting  03/26/18   OT LTG Outcome: Toileting  goal revised this date, goal ongoing   Tub-Shower Transfer Goal, OT   OT STG: Tub-Shower Transfer  modified independence   OT STG Date Established: Tub Shower Transfer  03/24/18   OT STG Duration: Tub-Shower Transfer  7 days or less   OT STG Date Reviewed: Tub Shower Transfer  03/26/18   OT STG Outcome: Tub-Shower Transfer  goal ongoing, goal revised this date   OT LTG: Tub-Shower Transfer  modified independence   OT LTG Date Established: Tub Shower Transfer  03/24/18   OT LTG Duration: Tub-Shower Transfer  14 days or less   OT LTG Date Reviewed: Tub Shower Transfer  03/26/18   OT LTG Outcome: Tub-Shower Transfer  goal revised this date, goal ongoing

## 2018-03-28 NOTE — ASSESSMENT & PLAN NOTE
Lower extremity Doppler done revealed a Left Tibial vein thrombosis  He is having more pain in his legs today however it is mainly around his knees  We will recheck Dopplers tomorrow to make sure the clot has not propagated  Continue aspirin 325 mg daily  He has no shortness of breath or chest pain

## 2018-03-28 NOTE — PROGRESS NOTES
Patient: Carl Sessions  Location: [facility] [address]  MRN: [id number]  Today's date: 3/28/2018         Prior Living Environment  Lives With: spouse  Living Arrangements: house  Home Accessibility: stairs to enter home, other (see comments)  Living Environment Comment: Pt's 2 story home has 1+1 NILE, Bed and full bath on 2nd flr., 1/2 bath on 1st,  2nd floor bath has a small built-in corner bench.  DME; RWalker, Power scooter, tub seat, reacher, long handled shoe horn, sock thomas, dressing stick, and long handled sponge.     Location, Bathroom: second floor, must negotiate stairs to access  Bathroom Access Comment: owns RTS + rails, will have shower chair + back [facility], owns grab bars in shower stall on 2nd floor     Prior Level of Function  Ambulation: independent  Transferring: independent  Toileting: independent  Bathing: independent  Dressing: independent  Eating: independent  Communication: understands/communicates without difficulty  Swallowing: swallows foods/liquids without difficulty  Prior Functional Level Comment:  (has shower bench,RTS, RW, getting grab bars)Dominant Hand: left          PT Treatment Summary - 03/28/18 1150        Session Details    Document Type daily treatment    Mode of Treatment physical therapy;individual therapy       Time Calculation    Start Time 1135    Stop Time 1205    Time Calculation (min) 30 min       Bed Chair WC Transfer Training    Assistive Device (Transfers) walker, front-wheeled    Sit-Stand Transfers, [place] modified independence    Stand-Sit Transfers, [place] modified independence    Stand Pivot Transfers, [place] modified independence    Supine to Sit, [place] modified independence    Sit to Supine, [place] modified independence    Supine to Sit to Supine, [place] modified independence       Gait Training    [place] modified independence    Assistive Device walker, front-wheeled    Distance in  Feet 200 feet    Gait Pattern Utilized step-through       LE Supine Therapeutic Exercise    Exercise(s) Performed SAQ (short arc quad) over bolster;quadriceps sets;heel slides (hip/knee flexion/extension);hip adduction;hip abduction    Device foam roll;powder board    Exercise Type AROM (active range of motion)    Expected Outcomes improve functional stability;strengthen, facilitate independent active range of motion;strengthen normal movement patterns    Sets/Reps Detail 2x10    Ability to Transfer Skills able to transfer skills from training to functional activity       PT Clinical Impression    Daily Outcome Statement Pt able to tolerate ex program with cont inc muscle control. Cont to progress with POC toward independence as appropriate.                        PT Care Plan Goals    Flowsheet Row Most Recent Value   Stair Goal, PT   PT STG: Stairs  minimum assist (75% or less patient effort)   STG Number of Stairs  8   Physical Therapy STG Date Established: Stairs  03/24/18   PT STG Duration: Stairs  5 days or less   PT STG Outcome: Stairs  goal ongoing   PT LTG: Stairs  modified independence   LTG Number of Stairs  12   Physical Therapy LTG Date Established: Stairs  03/24/18   PT LTG Duration: Stairs  21 days or less   Physical Therapy LTG Date Reviewed: Stairs  03/27/18   PT LTG Outcome: Stairs  goal ongoing   Walking Locomotion Goal, PT   PT STG: Walking Locomotion  supervision required, verbal cues required   Physical Therapy STG Date Established: Walking Locomotion  03/24/18 [Distances > than or = to 175'.]   PT STG Duration: Walking Locomotion  5 days or less   Physical Therapy STG Date Reviewed: Walking Locomotion  03/27/18   PT STG Outcome: Walking Locomotion  goal ongoing   PT LTG: Walking Locomotion  modified independence   Physical Therapy LTG Date Established: Walking Locomotion  03/24/18 [Distances > than or = to 300'.]   PT LTG Duration: Walking Locomotion  14 days or less   Physical Therapy LTG  Date Reviewed: Walking Locomotion  03/27/18   PT LTG Outcome: Walking Locomotion  goal ongoing   Additional Goal, PT   Physical Therapy STG: Date Established  03/24/18 [Pt. will increase B Knee PROM by > 10 degrees.]   PT STG Duration  5 days or less   Physical Therapy STG: Date Reviewed  03/27/18   PT STG Outcome  goal ongoing   Physical Therapy LTG: Date Established  03/24/18 [Pt. will increase B Knee PROM to > or = to 0-115 degrees.]   PT LTG Duration  21 days or less   Physical Therapy LTG: Date Reviewed  03/27/18   PT LTG Outcome  goal ongoing

## 2018-03-28 NOTE — PLAN OF CARE
Problem: Functional Mobility, Impaired (Adult)  Goal: Functional Mobility Fxn [place]  Outcome: Ongoing (interventions implemented as appropriate)

## 2018-03-28 NOTE — PROGRESS NOTES
Patient: Carl Sessions  Location: [facility] [address]  MRN: [id number]  Today's date: 3/28/2018         Prior Living Environment  Lives With: spouse  Living Arrangements: house  Home Accessibility: stairs to enter home, other (see comments)  Living Environment Comment: Pt's 2 story home has 1+1 NILE, Bed and full bath on 2nd flr., 1/2 bath on 1st,  2nd floor bath has a small built-in corner bench.  DME; RWalker, Power scooter, tub seat, reacher, long handled shoe horn, sock thomas, dressing stick, and long handled sponge.     Location, Bathroom: second floor, must negotiate stairs to access  Bathroom Access Comment: owns RTS + rails, will have shower chair + back [facility], owns grab bars in shower stall on 2nd floor     Prior Level of Function  Ambulation: independent  Transferring: independent  Toileting: independent  Bathing: independent  Dressing: independent  Eating: independent  Communication: understands/communicates without difficulty  Swallowing: swallows foods/liquids without difficulty  Prior Functional Level Comment:  (has shower bench,RTS, RW, getting grab bars)Dominant Hand: left          PT Treatment Summary - 03/28/18 1408        Session Details    Document Type daily treatment    Mode of Treatment group therapy       Time Calculation    Start Time 1406    Stop Time 1438    Time Calculation (min) 32 min       PT Clinical Impression    Daily Outcome Statement [place] education completed, please refer to education section for complete details                   Education provided this session. See the Patient Education summary report for full details.    PT Care Plan Goals    Flowsheet Row Most Recent Value   Stair Goal, PT   PT STG: Stairs  minimum assist (75% or less patient effort)   STG Number of Stairs  8   Physical Therapy STG Date Established: Stairs  03/24/18   PT STG Duration: Stairs  5 days or less   PT STG Outcome: Stairs  goal ongoing   PT LTG: Stairs  modified  independence   LTG Number of Stairs  12   Physical Therapy LTG Date Established: Stairs  03/24/18   PT LTG Duration: Stairs  21 days or less   Physical Therapy LTG Date Reviewed: Stairs  03/27/18   PT LTG Outcome: Stairs  goal ongoing   Walking Locomotion Goal, PT   PT STG: Walking Locomotion  supervision required, verbal cues required   Physical Therapy STG Date Established: Walking Locomotion  03/24/18 [Distances > than or = to 175'.]   PT STG Duration: Walking Locomotion  5 days or less   Physical Therapy STG Date Reviewed: Walking Locomotion  03/27/18   PT STG Outcome: Walking Locomotion  goal ongoing   PT LTG: Walking Locomotion  modified independence   Physical Therapy LTG Date Established: Walking Locomotion  03/24/18 [Distances > than or = to 300'.]   PT LTG Duration: Walking Locomotion  14 days or less   Physical Therapy LTG Date Reviewed: Walking Locomotion  03/27/18   PT LTG Outcome: Walking Locomotion  goal ongoing   Additional Goal, PT   Physical Therapy STG: Date Established  03/24/18 [Pt. will increase B Knee PROM by > 10 degrees.]   PT STG Duration  5 days or less   Physical Therapy STG: Date Reviewed  03/27/18   PT STG Outcome  goal ongoing   Physical Therapy LTG: Date Established  03/24/18 [Pt. will increase B Knee PROM to > or = to 0-115 degrees.]   PT LTG Duration  21 days or less   Physical Therapy LTG: Date Reviewed  03/27/18   PT LTG Outcome  goal ongoing

## 2018-03-28 NOTE — PLAN OF CARE
Problem: Patient Care Overview  Goal: Plan of Care Status/Review  Outcome: Ongoing (interventions implemented as appropriate)   03/28/18 1444   Patient Care Overview   IRF Plan of Care Status progress ongoing, continue   Progress, Functional Goals demonstrating adequate progress   Coping/Psychosocial   Plan Of Care Reviewed With patient   Patient upgraded to mod I in room with rolling walker today.

## 2018-03-28 NOTE — PLAN OF CARE
Problem: Patient Care Overview  Goal: Plan of Care Status/Review  Outcome: Ongoing (interventions implemented as appropriate)   03/28/18 1444   Patient Care Overview   IRF Plan of Care Status progress ongoing, continue   Progress, Functional Goals demonstrating adequate progress   Coping/Psychosocial   Plan Of Care Reviewed With patient   [place] Education class performed today, pt actively engaged, asking appropriate questions and participating

## 2018-03-28 NOTE — PROGRESS NOTES
"Daily Progress Note       Patient was seen and examined.   Attestation Notes: Face to face encounter completed    Subjective    Patient is progressing well with therapies.  Range of motion and mobility and self-care activities improving steadily.  Pain slowly improving.  Objective     BP (!) 111/59 (BP Location: Right upper arm, Patient Position: Sitting)   Pulse 84   Temp 36.3 °C (97.3 °F) (Oral)   Resp 18   Ht 1.676 m (5' 6\")   Wt 94.1 kg (207 lb 6.4 oz)   SpO2 (!) 84%   BMI 33.48 kg/m²      No intake or output data in the 24 hours ending 03/11/18 0953  Intake/Output this shift:  No intake/output data recorded.    Review of Systems:  Pertinent items are noted in HPI.  Denies chest pain and shortness of breath.  Review of systems otherwise negative.    Labs     Results from last 7 days  Lab Units 03/26/18  0619   WBC K/uL 7.45   HEMOGLOBIN g/dL 12.5*   HEMATOCRIT % 37.3*   PLATELETS K/uL 267       Results from last 7 days  Lab Units 03/26/18  0619   SODIUM mmol/L 135*   POTASSIUM mmol/L 4.3   CHLORIDE mmol/L 99   CO2 mmol/L 32   BUN mg/dL 20   CREATININE mg/dL 0.8   CALCIUM mg/dL 8.9   ALBUMIN g/dL 3.5   BILIRUBIN TOTAL mg/dL 1.5*   ALK PHOS IU/L 118   ALT IU/L 34   AST IU/L 20   GLUCOSE mg/dL 89         Imaging  Not applicable    Full Code    Physical Exam  General      Alert, cooperative, no distress, appears stated age.   Head:    Normocephalic, without obvious abnormality, atraumatic.   Eyes:    PERRL, conjunctiva/corneas clear, EOM's intact.        Nose:   Nares normal, septum midline, mucosa normal, no drainage or            sinus tenderness.   Throat:   Lips, mucosa, and tongue normal.    Neck:   Supple, symmetrical, trachea midline.    Back:     Symmetric, no curvature.   Lungs:     Clear to auscultation bilaterally, respirations unlabored.   Chest wall:    No tenderness or deformity.   Heart:    Regular rate and rhythm, S1 and S2 normal.   Abdomen:     Soft, non-tender, bowel sounds active all four " quadrants,     no masses, no organomegaly.   Extremities:  Musculoskeletal:  1+ lower extremity edema bilaterally.   Bilateral total knee arthroplasties.      Pulses:   1+ and symmetric all extremities.   Skin:  Skin healing very well with incisions completely intact.   Neurologic:          Behavior/  Emotional:  CNII-XII intact.  Alert and oriented ×3.  Motor exam intact.  Sensory exam intact.  Reflexes stable decreased reflexes.      Appropriate, cooperative           Plan of care was discussed with patient    Assessment & Plan  * S/P total knee arthroplasty, bilateral   Assessment & Plan    Bilateral total knee arthroplasties by Kishore Choe MD of orthopedic surgery at [facility] on March 19, 2018.  Patient tolerated surgery well.       Weight-bear as tolerated post surgery.    Complete empiric Keflex.    Apprehensive inpatient rehabilitation program to address functional deficits status post bilateral knee replacements.  Goals of modified independent to supervision with mobility self-care activities and return home.    The patient continues to improve nicely and steadily with therapies with improvement in range of motion, self-care activities, and mobility.  Pain is slowly improving as well and controlled with current medications.        Postoperative pain   Assessment & Plan    Oxycodone as needed for pain control.  Tylenol as needed.  Continue Celebrex.  Continue Lyrica 50 mg twice a day.  Added lidocaine patches with improvement.  Reduce medications at discharge.        At moderate risk for deep venous thrombosis   Assessment & Plan    Patient had Doppler ultrasounds of the lower extremities this morning and has a left posterior tibial DVT.  Continue with aspirin per orthopedic protocol.  The patient is for repeat Dopplers of the lower extremities tomorrow morning.  This was discussed with the ultrasound technician.        Depression   Assessment & Plan    Continue current dose of Celexa.  Staff  support  Can consider psychology consult        Proteinuria   Assessment & Plan    Continue lisinopril.  Monitor blood pressure.        Mild intermittent asthma   Assessment & Plan    Continue Singulair.  Incentive spirometry.        DM2 (diabetes mellitus, type 2) (CMS/[facility]) ([facility])   Assessment & Plan    Monitor blood sugars.  Diabetic diet.        Hypothyroid   Assessment & Plan    Levothyroxine to continue.        Mixed hyperlipidemia   Assessment & Plan    Continue on Zocor.        Atelectasis   Assessment & Plan    Incentive spirometry.        Bilateral lower extremity edema   Assessment & Plan    Edema control with elevating legs as needed.          Anemia due to blood loss   Assessment & Plan    Hemoglobin stable at 12.5            Expected Discharge Date:  3/30/2018    Individualized Plan of Care    Carl Chow is admitted to [facility] for comprehensive inpatient rehabilitation for   with functional deficits in  . Patient is receiving the following services:  .         Active medical management is required for   Patient Active Problem List   Diagnosis   • S/P total knee arthroplasty, bilateral   • Anemia due to blood loss   • Bilateral lower extremity edema   • Atelectasis   • Mixed hyperlipidemia   • Hypothyroid   • DM2 (diabetes mellitus, type 2) (CMS/[facility]) ([facility])   • Mild intermittent asthma   • Proteinuria   • Postoperative pain   • Depression   • At moderate risk for deep venous thrombosis       Risk for Complications       The patient's medical prognosis is good to achieve the stated goals below.    Expected Level of Function  Expected Functional Improvement: mobility;safety;self-care  Self-Care: Independent  Sphincter Control: Independent  Transfers: Independent  Locomotion: Independent  Communication: Independent  Social Cognition: Independent    Anticipated Discharge Plan       Expected length of stay:   March 30, 2018

## 2018-03-28 NOTE — ASSESSMENT & PLAN NOTE
Patient had Doppler ultrasounds of the lower extremities this morning and has a left posterior tibial DVT.  Continue with aspirin per orthopedic protocol.  The patient is for repeat Dopplers of the lower extremities tomorrow morning.  This was discussed with the ultrasound technician.

## 2018-03-29 ENCOUNTER — APPOINTMENT (INPATIENT)
Dept: PHYSICAL THERAPY | Facility: REHABILITATION | Age: 63
DRG: 560 | End: 2018-03-29
Payer: OTHER GOVERNMENT

## 2018-03-29 ENCOUNTER — APPOINTMENT (INPATIENT)
Dept: CARDIOLOGY | Facility: REHABILITATION | Age: 63
DRG: 560 | End: 2018-03-29
Attending: PHYSICAL MEDICINE & REHABILITATION
Payer: OTHER GOVERNMENT

## 2018-03-29 ENCOUNTER — APPOINTMENT (INPATIENT)
Dept: OCCUPATIONAL THERAPY | Facility: REHABILITATION | Age: 63
DRG: 560 | End: 2018-03-29
Payer: OTHER GOVERNMENT

## 2018-03-29 LAB — BSA FOR ECHO PROCEDURE: 2.09 M2

## 2018-03-29 PROCEDURE — 97116 GAIT TRAINING THERAPY: CPT | Mod: GP

## 2018-03-29 PROCEDURE — 12800000 HC ROOM AND CARE SEMIPRIVATE REHAB

## 2018-03-29 PROCEDURE — 97535 SELF CARE MNGMENT TRAINING: CPT | Mod: GO

## 2018-03-29 PROCEDURE — 97110 THERAPEUTIC EXERCISES: CPT | Mod: GP

## 2018-03-29 PROCEDURE — 93970 EXTREMITY STUDY: CPT | Mod: 50

## 2018-03-29 PROCEDURE — 63700000 HC SELF-ADMINISTRABLE DRUG: Performed by: PHYSICAL MEDICINE & REHABILITATION

## 2018-03-29 PROCEDURE — 63700000 HC SELF-ADMINISTRABLE DRUG: Performed by: HOSPITALIST

## 2018-03-29 RX ORDER — AMOXICILLIN 250 MG
1 CAPSULE ORAL 2 TIMES DAILY
Qty: 169 TABLET | Refills: 0
Start: 2018-03-29 | End: 2018-06-22

## 2018-03-29 RX ORDER — ACETAMINOPHEN 325 MG/1
650 TABLET ORAL EVERY 4 HOURS PRN
Start: 2018-03-29 | End: 2018-04-28

## 2018-03-29 RX ORDER — LIDOCAINE 560 MG/1
2 PATCH PERCUTANEOUS; TOPICAL; TRANSDERMAL DAILY
Qty: 172 PATCH | Refills: 0
Start: 2018-03-30 | End: 2018-06-24

## 2018-03-29 RX ORDER — OXYCODONE HYDROCHLORIDE 5 MG/1
5-10 TABLET ORAL EVERY 4 HOURS PRN
Qty: 30 TABLET | Refills: 0 | Status: SHIPPED | OUTPATIENT
Start: 2018-03-29 | End: 2018-04-03

## 2018-03-29 RX ORDER — POLYETHYLENE GLYCOL 3350 17 G/17G
17 POWDER, FOR SOLUTION ORAL DAILY PRN
Start: 2018-03-29 | End: 2018-04-28

## 2018-03-29 RX ORDER — ASPIRIN 325 MG
325 TABLET, DELAYED RELEASE (ENTERIC COATED) ORAL DAILY
Qty: 36 TABLET | Refills: 0
Start: 2018-03-30 | End: 2018-05-05

## 2018-03-29 RX ORDER — PANTOPRAZOLE SODIUM 40 MG/1
40 TABLET, DELAYED RELEASE ORAL
Qty: 30 TABLET | Refills: 0 | Status: SHIPPED | OUTPATIENT
Start: 2018-03-30 | End: 2018-04-29

## 2018-03-29 RX ADMIN — PANTOPRAZOLE SODIUM 40 MG: 40 TABLET, DELAYED RELEASE ORAL at 08:22

## 2018-03-29 RX ADMIN — ASPIRIN 325 MG: 325 TABLET, COATED ORAL at 08:23

## 2018-03-29 RX ADMIN — PREGABALIN 50 MG: 50 CAPSULE ORAL at 21:17

## 2018-03-29 RX ADMIN — LEVOTHYROXINE SODIUM 88 MCG: 88 TABLET ORAL at 06:19

## 2018-03-29 RX ADMIN — LIDOCAINE 2 PATCH: 246 PATCH TOPICAL at 08:27

## 2018-03-29 RX ADMIN — OXYCODONE HYDROCHLORIDE 5 MG: 5 TABLET ORAL at 21:18

## 2018-03-29 RX ADMIN — OXYCODONE HYDROCHLORIDE 10 MG: 5 TABLET ORAL at 07:55

## 2018-03-29 RX ADMIN — ACETAMINOPHEN 650 MG: 325 TABLET, FILM COATED ORAL at 00:56

## 2018-03-29 RX ADMIN — CEPHALEXIN 500 MG: 500 CAPSULE ORAL at 21:19

## 2018-03-29 RX ADMIN — SIMVASTATIN 80 MG: 40 TABLET, FILM COATED ORAL at 21:20

## 2018-03-29 RX ADMIN — CEPHALEXIN 500 MG: 500 CAPSULE ORAL at 12:39

## 2018-03-29 RX ADMIN — PREGABALIN 50 MG: 50 CAPSULE ORAL at 08:27

## 2018-03-29 RX ADMIN — CEPHALEXIN 500 MG: 500 CAPSULE ORAL at 08:23

## 2018-03-29 RX ADMIN — LISINOPRIL 1.25 MG: 2.5 TABLET ORAL at 01:25

## 2018-03-29 RX ADMIN — CITALOPRAM HYDROBROMIDE 40 MG: 20 TABLET, FILM COATED ORAL at 08:25

## 2018-03-29 RX ADMIN — OXYCODONE HYDROCHLORIDE 5 MG: 5 TABLET ORAL at 12:38

## 2018-03-29 RX ADMIN — MONTELUKAST SODIUM 10 MG: 10 TABLET, FILM COATED ORAL at 21:19

## 2018-03-29 RX ADMIN — CELECOXIB 200 MG: 200 CAPSULE ORAL at 08:23

## 2018-03-29 RX ADMIN — ACETAMINOPHEN 650 MG: 325 TABLET, FILM COATED ORAL at 21:21

## 2018-03-29 RX ADMIN — EZETIMIBE 10 MG: 10 TABLET ORAL at 21:19

## 2018-03-29 NOTE — PLAN OF CARE
Problem: Occupational Therapy Goals (BADL Skill Impairment)  Goal: Dressing Goals, Upper Body  Outcome: Outcome(s) Achieved Date Met: 03/29/18 03/29/18 0958   Upper Body Dressing Goal, OT   OT STG: Upper Body Dressing independent   Occupational Therapy STG Date Reviewed: UB Dressing 03/29/18   OT STG Outcome: Upper Body Dressing goal met   OT LTG: Upper Body Dressing independent   Occupational Therapy LTG Date Reviewed: UB Dressing 03/29/18   OT LTG Outcome: Upper Body Dressing goal met

## 2018-03-29 NOTE — PROGRESS NOTES
Patient: Carl Sessions  [facility] [address]  MRN: [id number]  Today's date: 3/29/2018          Pain/Vitals     Row Name 03/29/18 0903 03/29/18 1009       Pain/Comfort/Sleep    Presence of Pain  -- complains of pain/discomfort    Pain Body Location - Side  -- Bilateral    Pain Body Location - Orientation  -- incisional    Pain Body Location knee knee   R>L    Pain Rating (0-10): Rest 3 3       Vital Signs    Pulse 87  --    /63 124/68    BP Location  -- Right upper arm    BP Method  -- Automatic    Patient Position  -- Sitting          Prior Living Environment  Lives With: spouse  Living Arrangements: house  Home Accessibility: stairs to enter home, other (see comments)  Living Environment Comment: Pt's 2 story home has 1+1 NILE, Bed and full bath on 2nd flr., 1/2 bath on 1st,  2nd floor bath has a small built-in corner bench.  DME; RWalker, Power scooter, tub seat, reacher, long handled shoe horn, sock thomas, dressing stick, and long handled sponge.     Location, Bathroom: second floor, must negotiate stairs to access  Bathroom Access Comment: owns RTS + rails, will have shower chair + back [facility], owns grab bars in shower stall on 2nd floor     Prior Level of Function  Ambulation: independent  Transferring: independent  Toileting: independent  Bathing: independent  Dressing: independent  Eating: independent  Communication: understands/communicates without difficulty  Swallowing: swallows foods/liquids without difficulty  Prior Functional Level Comment:  (has shower bench,RTS, RW, getting grab bars)Dominant Hand: left          PT Treatment Summary - 03/29/18 1300        Session Details    Document Type discharge evaluation    Mode of Treatment group therapy;concurrent therapy;physical therapy       Time Calculation    Start Time 1300    Stop Time 1400    Time Calculation (min) 60 min       General LE Assessment    Lower Extremity: Range of Motion knee, right: LE  ROM deficit;knee, left: LE ROM deficit       ROM: Left Knee    Comment: Extension/Flexion 0-121 AROM in supine       ROM: Right Knee    Comment: Extension/Flexion 0- 122 AROM in supine       Bed Chair WC Transfer Training    Sit-Stand Transfers, [place] modified independence    Stand-Sit Transfers, [place] modified independence    Stand Pivot Transfers, [place] modified independence       Timed Up and Go Test    Trial One: Timed Up and Go Test 23.1    Comment, Timed Up and Go Test Mod I at RW level       LE Seated Therapeutic Exercise    Exercise(s) Performed hip flexion;knee extension;ankle dorsiflexion;ankle plantarflexion    Exercise Type AROM (active range of motion);isotonic contraction, concentric    Sets/Reps Detail 30xeach    Ability to Transfer Skills able to transfer skills from training to functional activity    Comment heel slides [id number] hold with towel B/L       LE Supine Therapeutic Exercise    Exercise(s) Performed gluteal sets;quadriceps sets;SLR (straight leg raise);SAQ (short arc quad) over bolster;hip abduction;hip adduction;hamstring sets;heel slides (hip/knee flexion/extension);bridging (bilateral w/bolster)    Exercise Type AROM (active range of motion);isometric contraction, static;isotonic contraction, concentric    Expected Outcomes improve functional tolerance, community activity    Sets/Reps Detail 3x10    Ability to Transfer Skills able to transfer skills from training to functional activity       PT Clinical Impression    Daily Outcome Statement Patient demonstrating improved muscular performance with supine and sitting exercise to improve quality /symmetry of gait kinematics at RW level.  Pt not achieving age-related norms with 10MWT and TUG.                   Education provided this session. See the Patient Education summary report for full details.    PT Care Plan Goals    Flowsheet Row Most Recent Value   Stair Goal, PT   PT STG: Stairs  modified independence   STG  Number of Stairs  12   Physical Therapy STG Date Established: Stairs  03/29/18   PT STG Duration: Stairs  5 days or less   Physical Therapy STG Date Reviewed: Stairs  03/29/18   PT STG Outcome: Stairs  goal met   PT LTG: Stairs  modified independence   LTG Number of Stairs  12   Physical Therapy LTG Date Established: Stairs  03/24/18   PT LTG Duration: Stairs  21 days or less   Physical Therapy LTG Date Reviewed: Stairs  03/29/18   PT LTG Outcome: Stairs  goal met   Walking Locomotion Goal, PT   PT STG: Walking Locomotion  modified independence   Physical Therapy STG Date Established: Walking Locomotion  03/29/18   PT STG Duration: Walking Locomotion  5 days or less   Physical Therapy STG Date Reviewed: Walking Locomotion  03/29/18   PT STG Outcome: Walking Locomotion  goal met   PT LTG: Walking Locomotion  modified independence   Physical Therapy LTG Date Established: Walking Locomotion  03/24/18 [Distances > than or = to 300'.]   PT LTG Duration: Walking Locomotion  14 days or less   Physical Therapy LTG Date Reviewed: Walking Locomotion  03/29/18   PT LTG Outcome: Walking Locomotion  goal met   Wheelchair Locomotion Goal, OT   PT STG: Wheelchair Locomotion  modified independence   PT LTG Outcome: Wheelchair Locomotion  goal met   Additional Goal, PT   Physical Therapy STG: Date Established  03/24/18 [Pt. will increase B Knee PROM by > 10 degrees.]   PT STG Duration  5 days or less   Physical Therapy STG: Date Reviewed  03/27/18   PT STG Outcome  goal ongoing   Physical Therapy LTG: Date Established  03/24/18 [Pt. will increase B Knee PROM to > or = to 0-115 degrees.]   PT LTG Duration  21 days or less   Physical Therapy LTG: Date Reviewed  03/27/18   PT LTG Outcome  goal ongoing

## 2018-03-29 NOTE — SUBJECTIVE & OBJECTIVE
Patient was seen and examined at the bedside  Attestation Notes: Face to face encounter completed    Subjective     Interval History: none.. there were no overnight issues reported.   History also provided by: nursing  No issues overnight  Feels his pain is well controlled  Did well during performance day today  Feels ready to go home tomorrow  Moving his bowels  Repeat doppler done this morning and the tibial vein thrombosis is unchanged  No headaches or dizziness  No Cp, SOB or cough  No abdominal pain  No dysuria, urgency or frequency     Objective     Vital signs in last 24 hours:  Temp:  [36.3 °C (97.3 °F)-37.4 °C (99.4 °F)] 37.4 °C (99.4 °F)  Heart Rate:  [75-87] 87  Resp:  [16-18] 16  BP: (111-136)/(59-76) 124/68    No intake or output data in the 24 hours ending 03/29/18 1437  Intake/Output this shift:  No intake/output data recorded.    Review of Systems:  Pertinent items are noted in HPI.    Labs  No new labs.    Imaging  Repeat doppler is unchanged    Current Facility-Administered Medications   Medication Dose Route Frequency   • acetaminophen  650 mg oral q4h PRN   • albuterol  2.5 mg nebulization q6h PRN   • alum-mag hydroxide-simeth  30 mL oral q6h PRN   • aspirin  325 mg oral Daily   • bisacodyl  10 mg rectal Daily PRN   • celecoxib  200 mg oral Daily   • cephalexin  500 mg oral QID   • citalopram  40 mg oral Daily   • glucose  16-32 g of dextrose oral PRN    Or   • dextrose  15-30 g of dextrose oral PRN    Or   • glucagon  1 mg intramuscular PRN    Or   • dextrose in water  25 mL intravenous PRN   • ezetimibe  10 mg oral Nightly    And   • simvastatin  80 mg oral Nightly   • levothyroxine  88 mcg oral Daily (6:30a)   • lidocaine  2 patch Topical Daily   • lisinopril  1.25 mg oral Daily   • magnesium hydroxide  30 mL oral 2x daily PRN   • montelukast  10 mg oral Nightly   • ondansetron ODT  4 mg oral q8h PRN   • oxyCODONE  5-10 mg oral q4h PRN   • pantoprazole  40 mg oral Daily before breakfast   •  polyethylene glycol  17 g oral Daily PRN   • pregabalin  50 mg oral BID   • sennosides-docusate sodium  1 tablet oral BID         VTE Assessment: ASA     Full Code    Physical Exam  GEN: NAD, sitting comfortably in bed  HEENT: PERRL, EOMI, moist mucus membranes  Heart: + S1, S2 regular  Lungs: CTA b/l  Abd: soft NT/ND + BS  Ext: minimal edema in the LE b/l, mild edema of both legs, incisions are healing well  Neuro: Aa0x3, no focal deficits       Plan of care was discussed with patient, nurse    62 year old male with a PMH of Asthma, depression, Dm, HLD, proteinuria and OA/DJD presents for acute rehab post bilateral TKA

## 2018-03-29 NOTE — PLAN OF CARE
Problem: Knee Arthroplasty (Total, Partial) (Adult)  Goal: Signs and Symptoms of Listed Potential Problems Will be Absent, Minimized or Managed (Knee Arthroplasty)  Outcome: Outcome(s) Achieved Date Met: 03/29/18

## 2018-03-29 NOTE — PLAN OF CARE
Problem: Patient Care Overview  Goal: Plan of Care Status/Review  Outcome: Adequate for Discharge   03/29/18 1417   Patient Care Overview   IRF Plan of Care Status progress ongoing, continue   Progress, Functional Goals demonstrating adequate progress   Coping/Psychosocial   Plan Of Care Reviewed With patient   Pt tolerated supine and sitting exercise program to maximize ROM in B knees 0-121/122.

## 2018-03-29 NOTE — PLAN OF CARE
Problem: Occupational Therapy Goals (Impaired Functional Mobility)  Goal: Tub-Shower Transfer Goals, OT  Outcome: Outcome(s) Achieved Date Met: 03/29/18 03/29/18 0956   Tub-Shower Transfer Goal, OT   OT STG: Tub-Shower Transfer modified independence   OT STG Date Reviewed: Tub Shower Transfer 03/29/18   OT STG Outcome: Tub-Shower Transfer goal met   OT LTG: Tub-Shower Transfer modified independence   OT LTG Date Reviewed: Tub Shower Transfer 03/29/18   OT LTG Outcome: Tub-Shower Transfer goal met

## 2018-03-29 NOTE — DISCHARGE INSTR - ACTIVITY
[place] achieved with all self care and functional transfers with reacher, rolling walker, raised toilet with rails and shower bench with wall mount grab bars.

## 2018-03-29 NOTE — PROGRESS NOTES
Patient: Carl Sessions  Location: [facility] [address]  MRN: [id number]  Today's date: 3/29/2018          Pain/Vitals     Row Name 03/29/18 0903        Pain/Comfort/Sleep    Presence of Pain  --     Pain Body Location - Side  --     Pain Body Location - Orientation  --     Pain Body Location knee     Pain Rating (0-10): Rest 3        Vital Signs    Pulse 87     /63     BP Location  --     BP Method  --     Patient Position  --           Prior Living Environment  Lives With: spouse  Living Arrangements: house  Home Accessibility: stairs to enter home, other (see comments)  Living Environment Comment: Pt's 2 story home has 1+1 NILE, Bed and full bath on 2nd flr., 1/2 bath on 1st,  2nd floor bath has a small built-in corner bench.  DME; RWalker, Power scooter, tub seat, reacher, long handled shoe horn, sock thomas, dressing stick, and long handled sponge.     Location, Bathroom: second floor, must negotiate stairs to access  Bathroom Access Comment: owns RTS + rails, will have shower chair + back [facility] from [facility], owns grab bars in shower stall on 2nd floor     Prior Level of Function  Ambulation: independent  Transferring: independent  Toileting: independent  Bathing: independent  Dressing: independent  Eating: independent  Communication: understands/communicates without difficulty  Swallowing: swallows foods/liquids without difficulty  Prior Functional Level Comment:  (has shower bench,RTS, RW, getting grab bars)Dominant Hand: left          OT Treatment Summary - 03/29/18 0911        Session Details    Document Type daily treatment    Mode of Treatment occupational therapy;individual therapy    Patient/Family Observations received in bed, awaiting OT performance day       Time Calculation    Start Time 0858    Stop Time 0958    Time Calculation (min) 60 min       General Information    Patient Profile Reviewed? yes       Safety Awareness/Health Promotion    Fall Prevention demonstrates  fall risk prevention techniques;demonstrates safety awareness related to fall risk;implements strategies to prevent falls       Bed Chair WC Transfer Training    Bed Mobility Assessment/Interventions bed mobility activities    [place] independent    Comment (Bed Mobility) flat bed, did not use rails    Assistive Device (Transfers) walker, front-wheeled    Bed-Chair Transfers, [place] modified independence    Chair-Bed Transfers, [place] modified independence    Sit-Stand Transfers, [place] modified independence    Stand-Sit Transfers, [place] modified independence    Stand Pivot Transfers, [place] modified independence       Shower Transfer Training    [place] modified independence    Assistive Device (Shower Transfer) shower chair;grab bars/tub rail    Techniques Used step over, right entry    Setup adaptive equipment set-up    Comment AE setup at home:  Bath bench with back and wall grab bars.  When seat was in non-preferred position, pt moved bench closer to shower head while standing with grab bar.       Toilet Transfer    [place] modified independence    Assistive Device (Toilet Transfer) walker, front-wheeled;raised toilet seat;other (see comments)    Transfer Techniques other (see comments)    Setup other (see comments)    Comment sit/stand RW ambulatory approach with AE already setup.   RTS with rails.       Upper Body Dressing    Upper Body Dressing Self-Performance obtains clothes;threads left arm, shirt;threads right arm, shirt;pulls shirt over head/around back;pulls shirt down/adjusts    Upper Body Dressing Position edge of bed sitting    Upper Body [place] independent    FIM: Upper Body Dressing Score 7-->Complete [place]       Lower Body Dressing    Lower Body Dressing Self-Performance obtains clothes;threads left leg;threads right leg;pulls underpants up or down;pulls pants up or down;dons/doffs socks;dons/doffs shoes;shoelaces    Lower Body Dressing  Position edge of bed sitting    Lower Body Dressing [place] independent;modified independence    Comment RW for steadying in standing, no other AE needed unless dropped item, used reacher       Bathing    Self-Performance chest;left arm;right arm;abdomen;front perineal area;left upper leg;buttocks;right upper leg;left lower leg, including foot;right lower leg, including foot    Bathing Position supported sitting    Assistive Device grab bar/tub rail;hand held shower spray hose       Toileting    [place] modified independence    Assistive Device Use raised toilet seat    Toileting Position unsupported sitting;unsupported standing    Comment RTS with rails       Grooming    Self-Performance washes, rinses and dries face;washes, rinses and dries hands;brushes/tamez hair;oral care (brushing teeth, cleaning dentures;shaves face    Grooming Position unsupported standing    [place] independent       Home Safety    Identified Needs adaptive equipment needs identified    Household Training simplification of environment    Bathroom grab-bars in shower;utilize shower chair;utilize raised toilet with rails       OT Clinical Impression    Plan For Care Reviewed: Occupational Therapy OT plan for care discussed with patient;patient voices agreement with OT plan for care    Expected Discharge Disposition home;other (see comments)   with family, pt will be alone during day.    Daily Outcome Statement Pt completed performance day at Mod I level. Demonstrated good safety awareness and excellent planning activities to maximize efficient independence.  Safe for dc home with AE as planned:  shower bench and grab bars and RTS with rails.                          OT Care Plan Goals    Flowsheet Row Most Recent Value   Bathing Goal, OT   OT STG: Bathing  modified independence   OT STG Date Established: Bathing  03/24/18   OT STG Duration: Bathing  7 days or less   OT STG Date Reviewed:  03/29/18   OT STG Outcome: Bathing   goal met   OT LTG: Bathing  modified independence   OT LTG Date Established: Bathing  03/24/18   OT LTG Duration: Bathing  14 days or less   OT LTG Date Reviewed: Bathing  03/29/18   OT LTG Outcome: Bathing  goal met   Grooming Goal, OT   OT STG: Grooming  independent   OT STG Date Established: Grooming  03/24/18   OT STG Duration: Grooming  7 days or less   OT STG Date Reviewed: Grooming  03/29/18   OT STG Outcome: Grooming  goal met   OT LTG: Grooming  independent   OT LTG Date Established: Grooming  03/24/18   OT LTG Duration: Grooming  14 days or less   OT LTG Date Reviewed: Grooming  03/29/18   OT LTG Outcome: Grooming  goal met   Upper Body Dressing Goal, OT   OT STG: Upper Body Dressing  independent   Occupational Therapy STG Date Established: UB Dressing  03/24/18   OT STG Duration: Upper Body Dressing  7 days or less   Occupational Therapy STG Date Reviewed: UB Dressing  03/29/18   OT STG Outcome: Upper Body Dressing  goal met   OT LTG: Upper Body Dressing  independent   Occupational Therapy LTG Date Established: UB Dressing  03/24/18   OT LTG Duration: Upper Body Dressing  14 days or less   Occupational Therapy LTG Date Reviewed: UB Dressing  03/29/18   OT LTG Outcome: Upper Body Dressing  goal met   Lower Body Dressing Goal, OT   OT STG: Lower Body Dressing  modified independence   OT STG Date Established: LB Dressing  03/24/18   OT STG Duration: Lower Body Dressing  7 days or less   OT STG Date Reviewed: LB Dressing  03/29/18   OT STG Outcome: Lower Body Dressing  goal met   OT LTG: Lower Body Dressing  modified independence   OT LTG Date Established: LB Dressing  03/24/18   OT LTG Duration: Lower Body Dressing  14 days or less   OT LTG Date Reviewed: LB Dressing  03/29/18   OT LTG Outcome: Lower Body Dressing  goal met   Toilet Transfer Goal, OT   OT STG: Toilet Transfer  modified independence   Occupational Therapy STG Date Established: Toilet Transfer  03/24/18   OT STG Duration: Toilet Transfer  7  days or less   Occupational Therapy STG Date Reviewed: Toilet Transfer  03/29/18   OT STG Outcome: Toilet Transfer  goal met   OT LTG: Toilet Transfer  modified independence   Occupational Therapy LTG Date Established: Toilet Transfer  03/24/18   OT LTG Duration: Toilet Transfer  14 days or less   Occupational Therapy LTG Date Reviewed: Toilet Transfer  03/29/18   OT LTG Outcome: Toilet Transfer  goal met   Toileting Goal, OT   OT STG: Toileting  modified independence   OT STG Date Established: Toileting  03/24/18   OT STG Duration: Toileting  7 days or less   OT STG Date Reviewed: Toileting  03/29/18   OT STG Outcome: Toileting  goal met   OT LTG: Toileting  modified independence   OT LTG Date Established: Toileting  03/24/18   OT LTG Duration: Toileting  14 days or less   OT LTG Date Reviewed: Toileting  03/29/18   OT LTG Outcome: Toileting  goal met   Tub-Shower Transfer Goal, OT   OT STG: Tub-Shower Transfer  modified independence   OT STG Date Established: Tub Shower Transfer  03/24/18   OT STG Duration: Tub-Shower Transfer  7 days or less   OT STG Date Reviewed: Tub Shower Transfer  03/29/18   OT STG Outcome: Tub-Shower Transfer  goal met   OT LTG: Tub-Shower Transfer  modified independence   OT LTG Date Established: Tub Shower Transfer  03/24/18   OT LTG Duration: Tub-Shower Transfer  14 days or less   OT LTG Date Reviewed: Tub Shower Transfer  03/29/18   OT LTG Outcome: Tub-Shower Transfer  goal met

## 2018-03-29 NOTE — SUBJECTIVE & OBJECTIVE
"   Patient was seen and examined.   Attestation Notes: Face to face encounter completed    Subjective    Pain improving.  Doing well in therapies.    Objective     /76   Pulse 75   Temp 37.4 °C (99.4 °F) (Oral)   Resp 16   Ht 1.676 m (5' 6\")   Wt 94.1 kg (207 lb 6.4 oz)   SpO2 98%   BMI 33.48 kg/m²     No intake or output data in the 24 hours ending 03/11/18 0953  Intake/Output this shift:  No intake/output data recorded.    Review of Systems:  Pertinent items are noted in HPI.  Denies chest pain and shortness of breath.  Review of systems otherwise negative.    Labs     Results from last 7 days  Lab Units 03/26/18  0619   WBC K/uL 7.45   HEMOGLOBIN g/dL 12.5*   HEMATOCRIT % 37.3*   PLATELETS K/uL 267       Results from last 7 days  Lab Units 03/26/18  0619   SODIUM mmol/L 135*   POTASSIUM mmol/L 4.3   CHLORIDE mmol/L 99   CO2 mmol/L 32   BUN mg/dL 20   CREATININE mg/dL 0.8   CALCIUM mg/dL 8.9   ALBUMIN g/dL 3.5   BILIRUBIN TOTAL mg/dL 1.5*   ALK PHOS IU/L 118   ALT IU/L 34   AST IU/L 20   GLUCOSE mg/dL 89       Full Code    Physical Exam  General      Alert, cooperative, no distress, appears stated age.   Head:    Normocephalic, without obvious abnormality, atraumatic.   Eyes:    PERRL, conjunctiva/corneas clear, EOM's intact.        Nose:   Nares normal, septum midline, mucosa normal, no drainage or            sinus tenderness.   Throat:   Lips, mucosa, and tongue normal.    Neck:   Supple, symmetrical, trachea midline.    Back:     Symmetric, no curvature.   Lungs:     Clear to auscultation bilaterally, respirations unlabored.   Chest wall:    No tenderness or deformity.   Heart:    Regular rate and rhythm, S1 and S2 normal.   Abdomen:     Soft, non-tender, bowel sounds active all four quadrants,     no masses, no organomegaly.   Extremities:  Musculoskeletal:  1+ lower extremity edema bilaterally.   Bilateral total knee arthroplasties.      Pulses:   1+ and symmetric all extremities.   Skin:   Skin " healing well.   Neurologic:          Behavior/  Emotional:  CNII-XII intact.  Alert and oriented ×3.  Motor exam intact.  Sensory exam intact.  Reflexes stable decreased reflexes.      Appropriate, cooperative           Plan of care was discussed with patient

## 2018-03-29 NOTE — PLAN OF CARE
Problem: Occupational Therapy Goals (BADL Skill Impairment)  Goal: Dressing Goals, Lower Body  Outcome: Outcome(s) Achieved Date Met: 03/29/18 03/29/18 1000   Lower Body Dressing Goal, OT   OT STG: Lower Body Dressing modified independence   OT STG Date Reviewed: LB Dressing 03/29/18   OT STG Outcome: Lower Body Dressing goal met   OT LTG: Lower Body Dressing modified independence   OT LTG Date Reviewed: LB Dressing 03/29/18   OT LTG Outcome: Lower Body Dressing goal met

## 2018-03-29 NOTE — PLAN OF CARE
Problem: Patient Care Overview  Goal: Plan of Care Status/Review  Outcome: Adequate for Discharge

## 2018-03-29 NOTE — PLAN OF CARE
Problem: Occupational Therapy Goals (BADL Skill Impairment)  Goal: Bathing Goals  Outcome: Outcome(s) Achieved Date Met: 03/29/18 03/29/18 0959   Bathing Goal, OT   OT STG: Bathing modified independence   OT STG Date Reviewed: 03/29/18   OT STG Outcome: Bathing goal met   OT LTG: Bathing modified independence   OT LTG Date Reviewed: Bathing 03/29/18   OT LTG Outcome: Bathing goal met

## 2018-03-29 NOTE — ASSESSMENT & PLAN NOTE
Lower extremity Doppler done revealed a Left Tibial vein thrombosis repeat is stable  Will remain on ASA 325mg daily and discuss the duration with ortho when he sees them on 4/16/18  He has no shortness of breath or chest pain

## 2018-03-29 NOTE — PLAN OF CARE
Problem: Occupational Therapy Goals (BADL Skill Impairment)  Goal: CPM S16 GIRF GROOMING GOALS.GIRF GROOMING UB  Outcome: Outcome(s) Achieved Date Met: 03/29/18 03/29/18 0958   Grooming Goal, OT   OT STG: Grooming independent   OT STG Date Reviewed: Grooming 03/29/18   OT STG Outcome: Grooming goal met   OT LTG: Grooming independent   OT LTG Date Reviewed: Grooming 03/29/18   OT LTG Outcome: Grooming goal met

## 2018-03-29 NOTE — ASSESSMENT & PLAN NOTE
Controlled with Celebrex, Lyrica, oxycodone, Tylenol  Will be discharged home with tylenol and oxycodone

## 2018-03-29 NOTE — PROGRESS NOTES
"Daily Progress Note       Patient was seen and examined.   Attestation Notes: Face to face encounter completed    Subjective    Pain improving.  Doing well in therapies.    Objective     /76   Pulse 75   Temp 37.4 °C (99.4 °F) (Oral)   Resp 16   Ht 1.676 m (5' 6\")   Wt 94.1 kg (207 lb 6.4 oz)   SpO2 98%   BMI 33.48 kg/m²      No intake or output data in the 24 hours ending 03/11/18 0953  Intake/Output this shift:  No intake/output data recorded.    Review of Systems:  Pertinent items are noted in HPI.  Denies chest pain and shortness of breath.  Review of systems otherwise negative.    Labs     Results from last 7 days  Lab Units 03/26/18  0619   WBC K/uL 7.45   HEMOGLOBIN g/dL 12.5*   HEMATOCRIT % 37.3*   PLATELETS K/uL 267       Results from last 7 days  Lab Units 03/26/18  0619   SODIUM mmol/L 135*   POTASSIUM mmol/L 4.3   CHLORIDE mmol/L 99   CO2 mmol/L 32   BUN mg/dL 20   CREATININE mg/dL 0.8   CALCIUM mg/dL 8.9   ALBUMIN g/dL 3.5   BILIRUBIN TOTAL mg/dL 1.5*   ALK PHOS IU/L 118   ALT IU/L 34   AST IU/L 20   GLUCOSE mg/dL 89       Full Code    Physical Exam  General      Alert, cooperative, no distress, appears stated age.   Head:    Normocephalic, without obvious abnormality, atraumatic.   Eyes:    PERRL, conjunctiva/corneas clear, EOM's intact.        Nose:   Nares normal, septum midline, mucosa normal, no drainage or            sinus tenderness.   Throat:   Lips, mucosa, and tongue normal.    Neck:   Supple, symmetrical, trachea midline.    Back:     Symmetric, no curvature.   Lungs:     Clear to auscultation bilaterally, respirations unlabored.   Chest wall:    No tenderness or deformity.   Heart:    Regular rate and rhythm, S1 and S2 normal.   Abdomen:     Soft, non-tender, bowel sounds active all four quadrants,     no masses, no organomegaly.   Extremities:  Musculoskeletal:  1+ lower extremity edema bilaterally.   Bilateral total knee arthroplasties.      Pulses:   1+ and symmetric all " extremities.   Skin:   Skin healing well.   Neurologic:          Behavior/  Emotional:  CNII-XII intact.  Alert and oriented ×3.  Motor exam intact.  Sensory exam intact.  Reflexes stable decreased reflexes.      Appropriate, cooperative           Plan of care was discussed with patient    Assessment & Plan  * S/P total knee arthroplasty, bilateral   Assessment & Plan    Bilateral total knee arthroplasties by Kishore Choe MD of orthopedic surgery at [facility] on March 19, 2018.  Patient tolerated surgery well.       Weight-bear as tolerated post surgery.    Complete empiric Keflex.    Apprehensive inpatient rehabilitation program to address functional deficits status post bilateral knee replacements.  Goals of modified independent to supervision with mobility self-care activities and return home.    The patient continues to improve nicely and steadily with therapies with improvement in range of motion, self-care activities, and mobility.  Pain is slowly improving as well and controlled with current medications.  The patient feels ready for home tomorrow.        Postoperative pain   Assessment & Plan    Oxycodone as needed for pain control.  Tylenol as needed.  Continue Celebrex.  Continue Lyrica 50 mg twice a day.  Added lidocaine patches with improvement.  Reduce medications at discharge.        At moderate risk for deep venous thrombosis   Assessment & Plan    Patient had Doppler ultrasounds of the lower extremities this morning and has a left posterior tibial DVT.  Continue with aspirin per orthopedic protocol.  The repeat Dopplers of the lower extremities--stable.        Depression   Assessment & Plan    Continue current dose of Celexa.  Staff support  Can consider psychology consult        Proteinuria   Assessment & Plan    Continue lisinopril.  Monitor blood pressure.        Mild intermittent asthma   Assessment & Plan    Continue Singulair.  Incentive spirometry.        DM2 (diabetes mellitus,  type 2) (CMS/HCC) (HCC)   Assessment & Plan    Monitor blood sugars.  Diabetic diet.        Hypothyroid   Assessment & Plan    Levothyroxine to continue.        Mixed hyperlipidemia   Assessment & Plan    Continue on Zocor.        Atelectasis   Assessment & Plan    Incentive spirometry.        Bilateral lower extremity edema   Assessment & Plan    Edema control with elevating legs as needed.          Anemia due to blood loss   Assessment & Plan    Hemoglobin stable at 12.5            Expected Discharge Date:  3/30/2018

## 2018-03-29 NOTE — PROGRESS NOTES
Patient: Carl Sessions  Location: [facility] [address]  MRN: [id number]  Today's date: 3/29/2018          Pain/Vitals     Row Name 03/29/18 0903 03/29/18 1009       Pain/Comfort/Sleep    Presence of Pain  -- complains of pain/discomfort    Pain Body Location - Side  -- Bilateral    Pain Body Location - Orientation  -- incisional    Pain Body Location knee knee   R>L    Pain Rating (0-10): Rest 3 3       Vital Signs    Pulse 87  --    /63 124/68    BP Location  -- Right upper arm    BP Method  -- Automatic    Patient Position  -- Sitting          Prior Living Environment  Lives With: spouse  Living Arrangements: house  Home Accessibility: stairs to enter home, other (see comments)  Living Environment Comment: Pt's 2 story home has 1+1 NILE, Bed and full bath on 2nd flr., 1/2 bath on 1st,  2nd floor bath has a small built-in corner bench.  DME; RWalker, Power scooter, tub seat, reacher, long handled shoe horn, sock thomas, dressing stick, and long handled sponge.     Location, Bathroom: second floor, must negotiate stairs to access  Bathroom Access Comment: owns RTS + rails, will [facility] shower chair + back [facility], owns grab bars in shower stall on 2nd floor     Prior Level of Function  Ambulation: independent  Transferring: independent  Toileting: independent  Bathing: independent  Dressing: independent  Eating: independent  Communication: understands/communicates without difficulty  Swallowing: swallows foods/liquids without difficulty  Prior Functional Level Comment:  (has shower bench,RTS, RW, getting grab bars)Dominant Hand: left          PT Treatment Summary - 03/29/18 1300        Session Details    Document Type discharge evaluation    Mode of Treatment group therapy;concurrent therapy;physical therapy       Time Calculation    Start Time 1300    Stop Time 1400    Time Calculation (min) 60 min       General LE Assessment    Lower Extremity: Range of Motion knee, right: LE  ROM deficit;knee, left: LE ROM deficit       ROM: Left Knee    Comment: Extension/Flexion 0-121 AROM in supine       ROM: Right Knee    Comment: Extension/Flexion 0- 122 AROM in supine       Bed Chair WC Transfer Training    Sit-Stand Transfers, Pillow modified independence    Stand-Sit Transfers, Pillow modified independence    Stand Pivot Transfers, Pillow modified independence       Timed Up and Go Test    Trial One: Timed Up and Go Test 23.1    Comment, Timed Up and Go Test Mod I at RW level       LE Seated Therapeutic Exercise    Exercise(s) Performed hip flexion;knee extension;ankle dorsiflexion;ankle plantarflexion    Exercise Type AROM (active range of motion);isotonic contraction, concentric    Sets/Reps Detail 30xeach    Ability to Transfer Skills able to transfer skills from training to functional activity    Comment heel slides [id number] hold with towel B/L       LE Supine Therapeutic Exercise    Exercise(s) Performed gluteal sets;quadriceps sets;SLR (straight leg raise);SAQ (short arc quad) over bolster;hip abduction;hip adduction;hamstring sets;heel slides (hip/knee flexion/extension);bridging (bilateral w/bolster)    Exercise Type AROM (active range of motion);isometric contraction, static;isotonic contraction, concentric    Expected Outcomes improve functional tolerance, community activity    Sets/Reps Detail 3x10    Ability to Transfer Skills able to transfer skills from training to functional activity       PT Clinical Impression    Daily Outcome Statement Patient demonstrating improved muscular performance with supine and sitting exercise to improve quality /symmetry of gait kinematics at RW level.  Pt not achieving age-related norms with 10MWT and TUG.                   Education provided this session. See the Patient Education summary report for full details.    PT Care Plan Goals    Flowsheet Row Most Recent Value   Stair Goal, PT   PT STG: Stairs  minimum assist (75% or less  patient effort)   STG Number of Stairs  8   Physical Therapy STG Date Established: Stairs  03/24/18   PT STG Duration: Stairs  5 days or less   PT STG Outcome: Stairs  goal ongoing   PT LTG: Stairs  modified independence   LTG Number of Stairs  12   Physical Therapy LTG Date Established: Stairs  03/24/18   PT LTG Duration: Stairs  21 days or less   Physical Therapy LTG Date Reviewed: Stairs  03/27/18   PT LTG Outcome: Stairs  goal ongoing   Walking Locomotion Goal, PT   PT STG: Walking Locomotion  supervision required, verbal cues required   Physical Therapy STG Date Established: Walking Locomotion  03/24/18 [Distances > than or = to 175'.]   PT STG Duration: Walking Locomotion  5 days or less   Physical Therapy STG Date Reviewed: Walking Locomotion  03/27/18   PT STG Outcome: Walking Locomotion  goal ongoing   PT LTG: Walking Locomotion  modified independence   Physical Therapy LTG Date Established: Walking Locomotion  03/24/18 [Distances > than or = to 300'.]   PT LTG Duration: Walking Locomotion  14 days or less   Physical Therapy LTG Date Reviewed: Walking Locomotion  03/27/18   PT LTG Outcome: Walking Locomotion  goal ongoing   Additional Goal, PT   Physical Therapy STG: Date Established  03/24/18 [Pt. will increase B Knee PROM by > 10 degrees.]   PT STG Duration  5 days or less   Physical Therapy STG: Date Reviewed  03/27/18   PT STG Outcome  goal ongoing   Physical Therapy LTG: Date Established  03/24/18 [Pt. will increase B Knee PROM to > or = to 0-115 degrees.]   PT LTG Duration  21 days or less   Physical Therapy LTG: Date Reviewed  03/27/18   PT LTG Outcome  goal ongoing

## 2018-03-29 NOTE — PLAN OF CARE
Problem: Occupational Therapy Goals (BADL Skill Impairment)  Goal: Dressing Goals, Lower Body   03/29/18 0959   Lower Body Dressing Goal, OT   OT STG: Lower Body Dressing modified independence   OT STG Date Reviewed: LB Dressing 03/29/18   OT STG Outcome: Lower Body Dressing goal met   OT LTG: Lower Body Dressing modified independence   OT LTG Date Reviewed: LB Dressing 03/29/18   OT LTG Outcome: Lower Body Dressing goal met

## 2018-03-29 NOTE — PLAN OF CARE
Problem: Occupational Therapy Goals (BADL Skill Impairment)  Goal: Toileting Goals  Outcome: Outcome(s) Achieved Date Met: 03/29/18 03/29/18 0957   Toileting Goal, OT   OT STG: Toileting modified independence   OT STG Date Reviewed: Toileting 03/29/18   OT STG Outcome: Toileting goal met   OT LTG: Toileting modified independence   OT LTG Date Reviewed: Toileting 03/29/18   OT LTG Outcome: Toileting goal met

## 2018-03-29 NOTE — ASSESSMENT & PLAN NOTE
Patient had Doppler ultrasounds of the lower extremities this morning and has a left posterior tibial DVT.  Continue with aspirin per orthopedic protocol.  The repeat Dopplers of the lower extremities--stable.

## 2018-03-29 NOTE — ASSESSMENT & PLAN NOTE
Done by Dr. Neff at [facility] on 3/19/2018  Continue to work with PT/OT  Follow up on 4/19

## 2018-03-29 NOTE — PLAN OF CARE
Problem: Functional Mobility, Impaired (Adult)  Goal: Functional Mobility Fxn [place]  Outcome: Ongoing (interventions implemented as appropriate)

## 2018-03-29 NOTE — PLAN OF CARE
Problem: Knee Arthroplasty (Total, Partial) (Adult)  Goal: Signs and Symptoms of Listed Potential Problems Will be Absent, Minimized or Managed (Knee Arthroplasty)  Outcome: Outcome(s) Achieved Date Met: 03/29/18      Problem: Functional Mobility, Impaired (Adult)  Goal: Functional Mobility Fxn [place]  Outcome: Outcome(s) Achieved Date Met: 03/29/18 03/29/18 1502   Functional Mobility, Impaired (Adult)   Functional Mobility Fxn [place] functional independence achieved

## 2018-03-29 NOTE — PLAN OF CARE
Problem: Occupational Therapy Goals (Impaired Functional Mobility)  Goal: Toilet Transfer Goals, OT  Outcome: Outcome(s) Achieved Date Met: 03/29/18 03/29/18 0954   Toilet Transfer Goal, OT   OT STG: Toilet Transfer modified independence   Occupational Therapy STG Date Reviewed: Toilet Transfer 03/29/18   OT STG Outcome: Toilet Transfer goal met   OT LTG: Toilet Transfer modified independence   Occupational Therapy LTG Date Reviewed: Toilet Transfer 03/29/18   OT LTG Outcome: Toilet Transfer goal met

## 2018-03-29 NOTE — PLAN OF CARE
Problem: Physical Therapy Goals (Impaired Functional Mobility)  Goal: Bed-Chair Transfer Goals, PT  Outcome: Adequate for Discharge

## 2018-03-30 VITALS
TEMPERATURE: 97.7 F | SYSTOLIC BLOOD PRESSURE: 135 MMHG | BODY MASS INDEX: 33.33 KG/M2 | RESPIRATION RATE: 16 BRPM | OXYGEN SATURATION: 95 % | HEIGHT: 66 IN | DIASTOLIC BLOOD PRESSURE: 70 MMHG | WEIGHT: 207.4 LBS | HEART RATE: 68 BPM

## 2018-03-30 PROCEDURE — 63700000 HC SELF-ADMINISTRABLE DRUG: Performed by: HOSPITALIST

## 2018-03-30 PROCEDURE — 63700000 HC SELF-ADMINISTRABLE DRUG: Performed by: PHYSICAL MEDICINE & REHABILITATION

## 2018-03-30 RX ADMIN — OXYCODONE HYDROCHLORIDE 5 MG: 5 TABLET ORAL at 09:17

## 2018-03-30 RX ADMIN — CEPHALEXIN 500 MG: 500 CAPSULE ORAL at 08:34

## 2018-03-30 RX ADMIN — ASPIRIN 325 MG: 325 TABLET, COATED ORAL at 08:32

## 2018-03-30 RX ADMIN — LISINOPRIL 1.25 MG: 2.5 TABLET ORAL at 08:34

## 2018-03-30 RX ADMIN — CELECOXIB 200 MG: 200 CAPSULE ORAL at 08:32

## 2018-03-30 RX ADMIN — CITALOPRAM HYDROBROMIDE 40 MG: 20 TABLET, FILM COATED ORAL at 08:35

## 2018-03-30 RX ADMIN — LIDOCAINE 2 PATCH: 246 PATCH TOPICAL at 08:36

## 2018-03-30 RX ADMIN — PREGABALIN 50 MG: 50 CAPSULE ORAL at 08:47

## 2018-03-30 RX ADMIN — PANTOPRAZOLE SODIUM 40 MG: 40 TABLET, DELAYED RELEASE ORAL at 08:30

## 2018-03-30 RX ADMIN — SENNOSIDES AND DOCUSATE SODIUM 1 TABLET: 8.6; 5 TABLET ORAL at 08:32

## 2018-03-30 RX ADMIN — LEVOTHYROXINE SODIUM 88 MCG: 88 TABLET ORAL at 06:52

## 2018-03-30 RX ADMIN — ACETAMINOPHEN 650 MG: 325 TABLET, FILM COATED ORAL at 08:47

## 2018-03-30 RX ADMIN — CEPHALEXIN 500 MG: 500 CAPSULE ORAL at 12:38

## 2018-03-30 NOTE — ASSESSMENT & PLAN NOTE
Patient had Doppler ultrasounds of the lower extremities and has a left posterior tibial DVT.  Continue with aspirin per orthopedic protocol.  The repeat Dopplers of the lower extremities--stable.

## 2018-03-30 NOTE — PROGRESS NOTES
"Daily Progress Note       Patient was seen and examined.   Attestation Notes: Face to face encounter completed    Subjective    Patient feels improved overall.  His mobility and transfers and self-care activities have improved to a modified independent level.  Range of motion is improved.  Patient feels ready for discharge home today.  Objective     /70 (BP Location: Left upper arm, Patient Position: Lying)   Pulse 68   Temp 36.5 °C (97.7 °F) (Oral)   Resp 16   Ht 1.676 m (5' 6\")   Wt 94.1 kg (207 lb 6.4 oz)   SpO2 95%   BMI 33.48 kg/m²      No intake or output data in the 24 hours ending 03/11/18 0953  Intake/Output this shift:  No intake/output data recorded.    Review of Systems:  Pertinent items are noted in HPI.  Denies chest pain and shortness of breath.  Review of systems otherwise negative.    Labs     Results from last 7 days  Lab Units 03/26/18  0619   WBC K/uL 7.45   HEMOGLOBIN g/dL 12.5*   HEMATOCRIT % 37.3*   PLATELETS K/uL 267       Results from last 7 days  Lab Units 03/26/18  0619   SODIUM mmol/L 135*   POTASSIUM mmol/L 4.3   CHLORIDE mmol/L 99   CO2 mmol/L 32   BUN mg/dL 20   CREATININE mg/dL 0.8   CALCIUM mg/dL 8.9   ALBUMIN g/dL 3.5   BILIRUBIN TOTAL mg/dL 1.5*   ALK PHOS IU/L 118   ALT IU/L 34   AST IU/L 20   GLUCOSE mg/dL 89         Imaging  Not applicable    Full Code    Physical Exam  General      Alert, cooperative, no distress, appears stated age.   Head:    Normocephalic, without obvious abnormality, atraumatic.   Eyes:    PERRL, conjunctiva/corneas clear, EOM's intact.        Nose:   Nares normal, septum midline, mucosa normal, no drainage or            sinus tenderness.   Throat:   Lips, mucosa, and tongue normal.    Neck:   Supple, symmetrical, trachea midline.    Back:     Symmetric, no curvature.   Lungs:     Clear to auscultation bilaterally, respirations unlabored.   Chest wall:    No tenderness or deformity.   Heart:    Regular rate and rhythm, S1 and S2 normal. "   Abdomen:     Soft, non-tender, bowel sounds active all four quadrants,     no masses, no organomegaly.   Extremities:  Musculoskeletal:  1+ lower extremity edema bilaterally.   Bilateral total knee arthroplasties.      Pulses:   1+ and symmetric all extremities.   Skin:  Skin is healing well incisions intact.   Neurologic:          Behavior/  Emotional:  CNII-XII intact.  Alert and oriented ×3.  Motor exam intact.  Sensory exam intact.  Reflexes stable decreased reflexes.      Appropriate, cooperative           Plan of care was discussed with patient    Assessment & Plan  * S/P total knee arthroplasty, bilateral   Assessment & Plan    Bilateral total knee arthroplasties by Kishore Choe MD of orthopedic surgery at [facility] on March 19, 2018.  Patient tolerated surgery well.       Weight-bear as tolerated post surgery.    Completed empiric Keflex.    The patient completed a comprehensive inpatient rehabilitation stay for his functional deficits progressing to a modified independent to supervision level with all of his mobility self-care activities.  Patient is now appropriate for discharge home at these levels of improved function.    Discharge medications and instructions reviewed.    Follow-up with orthopedic surgery.    Follow-up with primary care physician.        Postoperative pain   Assessment & Plan    Oxycodone as needed for pain control.  The importance of weaning oxycodone postoperatively was discussed as well as pros and cons and risks of opiates.  Tylenol as needed.  The patient can stop both Celebrex and Lyrica on discharge..  Lidocaine patches as needed on an outpatient basis over-the-counter.        At moderate risk for deep venous thrombosis   Assessment & Plan    Patient had Doppler ultrasounds of the lower extremities and has a left posterior tibial DVT.  Continue with aspirin per orthopedic protocol.  The repeat Dopplers of the lower extremities--stable.        Depression   Assessment  & Plan    Continue current dose of Celexa.  Staff support           Proteinuria   Assessment & Plan    Continue lisinopril.           Mild intermittent asthma   Assessment & Plan    Continue Singulair.           DM2 (diabetes mellitus, type 2) (CMS/[facility]) ([facility])   Assessment & Plan    Monitor blood sugars.  Diabetic diet.        Hypothyroid   Assessment & Plan    Levothyroxine to continue.        Mixed hyperlipidemia   Assessment & Plan    Continue on Zocor.        Atelectasis   Assessment & Plan             Bilateral lower extremity edema   Assessment & Plan    Edema control with elevating legs as needed.          Anemia due to blood loss   Assessment & Plan    Hemoglobin stable at 12.5            Expected Discharge Date:  3/30/2018

## 2018-03-30 NOTE — ASSESSMENT & PLAN NOTE
Oxycodone as needed for pain control.  The importance of weaning oxycodone postoperatively was discussed as well as pros and cons and risks of opiates.  Tylenol as needed.  The patient can stop both Celebrex and Lyrica on discharge..  Lidocaine patches as needed on an outpatient basis over-the-counter.

## 2018-03-30 NOTE — DISCHARGE SUMMARY
Inpatient Discharge Summary    BRIEF OVERVIEW  Admitting Provider: Robert Luo MD  Discharge Provider: No att. providers found  Primary Care Physician at Discharge: Attending Physician Non Employed, [phone]      Admission Date: 3/23/2018     Discharge Date: 3/30/2018    Hospital Course    S/P total knee arthroplasty, bilateral  Done by Dr. Neff at [facility] on 3/19/2018  Continue to work with PT/OT  Follow up on 4/19      Bilateral total knee arthroplasties by Kishore Choe MD of orthopedic surgery at [facility] on March 19, 2018.  Patient tolerated surgery well.       Weight-bear as tolerated post surgery.    Completed empiric Keflex.    The patient completed a comprehensive inpatient rehabilitation stay for his functional deficits progressing to a modified independent to supervision level with all of his mobility self-care activities.  Patient is now appropriate for discharge home at these levels of improved function.    Discharge medications and instructions reviewed.    Follow-up with orthopedic surgery.    Follow-up with primary care physician.    Mixed hyperlipidemia  Continue on Zocor.    Hypothyroid  Levothyroxine to continue.    DM2 (diabetes mellitus, type 2) (CMS/HCC) ([facility])  Monitor blood sugars.  Diabetic diet.    Mild intermittent asthma  Continue Singulair.       Proteinuria  Continue lisinopril.       Postoperative pain  Oxycodone as needed for pain control.  The importance of weaning oxycodone postoperatively was discussed as well as pros and cons and risks of opiates.  Tylenol as needed.  The patient can stop both Celebrex and Lyrica on discharge..  Lidocaine patches as needed on an outpatient basis over-the-counter.    Anemia due to blood loss  Hemoglobin stable at 12.5    Bilateral lower extremity edema  Edema control with elevating legs as needed.      Atelectasis       Depression  Continue current dose of Celexa.  Staff support       At moderate risk for  deep venous thrombosis  Patient had Doppler ultrasounds of the lower extremities and has a left posterior tibial DVT.  Continue with aspirin per orthopedic protocol.  The repeat Dopplers of the lower extremities--stable.          Discharge Disposition  Home   Code Status at Discharge: Prior    Discharge Medications     Medication List      START taking these medications    acetaminophen 325 mg tablet  Commonly known as:  TYLENOL  Take 2 tablets (650 mg total) by mouth every 4 (four) hours as needed for moderate pain.     aspirin 325 mg EC tablet  Take 1 tablet (325 mg total) by mouth daily for 36 doses.     lidocaine 4 % adhesive patch,medicated topical patch  Commonly known as:  ASPERCREME  Apply 2 patches topically daily for 86 doses.     oxyCODONE 5 mg immediate release tablet  Commonly known as:  ROXICODONE  Take 1-2 tablets (5-10 mg total) by mouth every 4 (four) hours as needed for moderate pain for up to 5 days Earliest Fill Date: 3/29/18.     pantoprazole 40 mg EC tablet  Commonly known as:  PROTONIX  Take 1 tablet (40 mg total) by mouth daily before breakfast.     polyethylene glycol 17 gram packet  Commonly known as:  MIRALAX  Take 17 g by mouth daily as needed (constipation).     sennosides-docusate sodium 8.6-50 mg  Commonly known as:  SENOKOT-S  Take 1 tablet by mouth 2 (two) times a day for 169 doses.           Where to Get Your Medications      These medications were sent to [facility] [place] [address], [place] [address]  [address] [place] [id number]    Phone:  [phone]   · pantoprazole 40 mg EC tablet     You can get these medications from any pharmacy    Bring a paper prescription for each of these medications  · oxyCODONE 5 mg immediate release tablet     Information about where to get these medications is not yet available    Ask your nurse or doctor about these medications  · acetaminophen 325 mg tablet  · aspirin 325 mg EC tablet  · lidocaine 4 % adhesive  patch,medicated topical patch  · polyethylene glycol 17 gram packet  · sennosides-docusate sodium 8.6-50 mg

## 2018-03-30 NOTE — SUBJECTIVE & OBJECTIVE
"   Patient was seen and examined.   Attestation Notes: Face to face encounter completed    Subjective    Patient feels improved overall.  His mobility and transfers and self-care activities have improved to a modified independent level.  Range of motion is improved.  Patient feels ready for discharge home today.  Objective     /70 (BP Location: Left upper arm, Patient Position: Lying)   Pulse 68   Temp 36.5 °C (97.7 °F) (Oral)   Resp 16   Ht 1.676 m (5' 6\")   Wt 94.1 kg (207 lb 6.4 oz)   SpO2 95%   BMI 33.48 kg/m²     No intake or output data in the 24 hours ending 03/11/18 0953  Intake/Output this shift:  No intake/output data recorded.    Review of Systems:  Pertinent items are noted in HPI.  Denies chest pain and shortness of breath.  Review of systems otherwise negative.    Labs     Results from last 7 days  Lab Units 03/26/18  0619   WBC K/uL 7.45   HEMOGLOBIN g/dL 12.5*   HEMATOCRIT % 37.3*   PLATELETS K/uL 267       Results from last 7 days  Lab Units 03/26/18  0619   SODIUM mmol/L 135*   POTASSIUM mmol/L 4.3   CHLORIDE mmol/L 99   CO2 mmol/L 32   BUN mg/dL 20   CREATININE mg/dL 0.8   CALCIUM mg/dL 8.9   ALBUMIN g/dL 3.5   BILIRUBIN TOTAL mg/dL 1.5*   ALK PHOS IU/L 118   ALT IU/L 34   AST IU/L 20   GLUCOSE mg/dL 89         Imaging  Not applicable    Full Code    Physical Exam  General      Alert, cooperative, no distress, appears stated age.   Head:    Normocephalic, without obvious abnormality, atraumatic.   Eyes:    PERRL, conjunctiva/corneas clear, EOM's intact.        Nose:   Nares normal, septum midline, mucosa normal, no drainage or            sinus tenderness.   Throat:   Lips, mucosa, and tongue normal.    Neck:   Supple, symmetrical, trachea midline.    Back:     Symmetric, no curvature.   Lungs:     Clear to auscultation bilaterally, respirations unlabored.   Chest wall:    No tenderness or deformity.   Heart:    Regular rate and rhythm, S1 and S2 normal.   Abdomen:     Soft, " non-tender, bowel sounds active all four quadrants,     no masses, no organomegaly.   Extremities:  Musculoskeletal:  1+ lower extremity edema bilaterally.   Bilateral total knee arthroplasties.      Pulses:   1+ and symmetric all extremities.   Skin:  Skin is healing well incisions intact.   Neurologic:          Behavior/  Emotional:  CNII-XII intact.  Alert and oriented ×3.  Motor exam intact.  Sensory exam intact.  Reflexes stable decreased reflexes.      Appropriate, cooperative           Plan of care was discussed with patient

## 2018-03-30 NOTE — PROGRESS NOTES
3/29- nadege - pt stable for dc as planned on the 30th.   [facility] and services [facility] geographic location. RN and PT requested .  Pt pleased as he prefers home care to outpatient. Cm faxed information to Yady at the [facility] for authorization for payment.  Pt will [facility] home on the 30th [facility].  DC on the 30th. Goals met - Merced STONERW

## 2018-03-30 NOTE — ASSESSMENT & PLAN NOTE
Bilateral total knee arthroplasties by Kishore Choe MD of orthopedic surgery at [facility] on March 19, 2018.  Patient tolerated surgery well.       Weight-bear as tolerated post surgery.    Completed empiric Keflex.    The patient completed a comprehensive inpatient rehabilitation stay for his functional deficits progressing to a modified independent to supervision level with all of his mobility self-care activities.  Patient is now appropriate for discharge home at these levels of improved function.    Discharge medications and instructions reviewed.    Follow-up with orthopedic surgery.    Follow-up with primary care physician.

## 2021-05-27 NOTE — ASSESSMENT & PLAN NOTE
Bilateral total knee arthroplasties by Kishore Choe MD of orthopedic surgery at [facility] on March 19, 2018.  Patient tolerated surgery well.       Weight-bear as tolerated post surgery.    Complete empiric Keflex.    Apprehensive inpatient rehabilitation program to address functional deficits status post bilateral knee replacements.  Goals of modified independent to supervision with mobility self-care activities and return home.    The patient continues to improve nicely and steadily with therapies with improvement in range of motion, self-care activities, and mobility.  Pain is slowly improving as well and controlled with current medications.  The patient feels ready for home tomorrow.   36.9

## 2021-09-29 ENCOUNTER — TELEPHONE (OUTPATIENT)
Dept: PREADMISSION TESTING | Facility: HOSPITAL | Age: 66
End: 2021-09-29

## 2022-01-31 ENCOUNTER — OCCMED (OUTPATIENT)
Dept: URGENT CARE | Facility: CLINIC | Age: 67
End: 2022-01-31

## 2022-01-31 DIAGNOSIS — Z13.9 ENCOUNTER FOR HEALTH-RELATED SCREENING: Primary | ICD-10-CM

## 2022-01-31 LAB — RUBV IGG SERPL IA-ACNC: >175 IU/ML

## 2022-01-31 PROCEDURE — 86735 MUMPS ANTIBODY: CPT

## 2022-01-31 PROCEDURE — 86787 VARICELLA-ZOSTER ANTIBODY: CPT

## 2022-01-31 PROCEDURE — 86762 RUBELLA ANTIBODY: CPT

## 2022-01-31 PROCEDURE — 86765 RUBEOLA ANTIBODY: CPT

## 2022-02-01 LAB
MEV IGG SER QL: NORMAL
MUV IGG SER QL: NORMAL
VZV IGG SER IA-ACNC: NORMAL

## 2022-09-10 NOTE — PLAN OF CARE
Problem: Patient Care Overview  Goal: Plan of Care Status/Review  Outcome: Ongoing (interventions implemented as appropriate)   03/28/18 1216   Patient Care Overview   IRF Plan of Care Status progress ongoing, continue   Progress, Functional Goals demonstrating adequate progress   Coping/Psychosocial   Plan Of Care Reviewed With patient   Pt tolerated session well and demos good balance.        Speech Language/Pathology  Chart reviewed; arrived to room for dysphagia tx  Pt in bed; asleep upon arrival  Pt highly lethargic despite multiple verbal; + tactile cues  ST to f/u as able and appropriate when pt is awake and alert

## 2024-11-01 NOTE — PROGRESS NOTES
Daily Progress Note    Patient was seen and examined at the bedside  Attestation Notes: Face to face encounter completed    Subjective     Interval History: none.. there were no overnight issues reported.   History also provided by: nursing  No issues overnight  Feels his pain is well controlled  Did well during performance day today  Feels ready to go home tomorrow  Moving his bowels  Repeat doppler done this morning and the tibial vein thrombosis is unchanged  No headaches or dizziness  No Cp, SOB or cough  No abdominal pain  No dysuria, urgency or frequency     Objective     Vital signs in last 24 hours:  Temp:  [36.3 °C (97.3 °F)-37.4 °C (99.4 °F)] 37.4 °C (99.4 °F)  Heart Rate:  [75-87] 87  Resp:  [16-18] 16  BP: (111-136)/(59-76) 124/68    No intake or output data in the 24 hours ending 03/29/18 1437  Intake/Output this shift:  No intake/output data recorded.    Review of Systems:  Pertinent items are noted in HPI.    Labs  No new labs.    Imaging  Repeat doppler is unchanged    Current Facility-Administered Medications   Medication Dose Route Frequency   • acetaminophen  650 mg oral q4h PRN   • albuterol  2.5 mg nebulization q6h PRN   • alum-mag hydroxide-simeth  30 mL oral q6h PRN   • aspirin  325 mg oral Daily   • bisacodyl  10 mg rectal Daily PRN   • celecoxib  200 mg oral Daily   • cephalexin  500 mg oral QID   • citalopram  40 mg oral Daily   • glucose  16-32 g of dextrose oral PRN    Or   • dextrose  15-30 g of dextrose oral PRN    Or   • glucagon  1 mg intramuscular PRN    Or   • dextrose in water  25 mL intravenous PRN   • ezetimibe  10 mg oral Nightly    And   • simvastatin  80 mg oral Nightly   • levothyroxine  88 mcg oral Daily (6:30a)   • lidocaine  2 patch Topical Daily   • lisinopril  1.25 mg oral Daily   • magnesium hydroxide  30 mL oral 2x daily PRN   • montelukast  10 mg oral Nightly   • ondansetron ODT  4 mg oral q8h PRN   • oxyCODONE  5-10 mg oral q4h PRN   • pantoprazole  40 mg oral Daily  before breakfast   • polyethylene glycol  17 g oral Daily PRN   • pregabalin  50 mg oral BID   • sennosides-docusate sodium  1 tablet oral BID         VTE Assessment: ASA     Full Code    Physical Exam  GEN: NAD, sitting comfortably in bed  HEENT: PERRL, EOMI, moist mucus membranes  Heart: + S1, S2 regular  Lungs: CTA b/l  Abd: soft NT/ND + BS  Ext: minimal edema in the LE b/l, mild edema of both legs, incisions are healing well  Neuro: Aa0x3, no focal deficits       Plan of care was discussed with patient, nurse    62 year old male with a PMH of Asthma, depression, Dm, HLD, proteinuria and OA/DJD presents for acute rehab post bilateral TKA     Assessment & Plan  * S/P total knee arthroplasty, bilateral   Assessment & Plan    Done by Dr. Neff at [facility] on 3/19/2018  Continue to work with PT/OT  Follow up on 4/19        At moderate risk for deep venous thrombosis   Assessment & Plan    Repeat Doppler done and unchanged   Maintain full dose ASA        Depression   Assessment & Plan    Stable on Celexa daily        Postoperative pain   Assessment & Plan    Controlled with Celebrex, Lyrica, oxycodone, Tylenol  Will be discharged home with tylenol and oxycodone         Proteinuria   Assessment & Plan    On a small dose of lisinopril 1.25 mg        Mild intermittent asthma   Assessment & Plan    Stable at this time nebs ordered as needed        DM2 (diabetes mellitus, type 2) (CMS/HCC) ([facility])   Assessment & Plan    Sugars are currently well controlled  He is not on any medications for diabetes at this time        Hypothyroid   Assessment & Plan    Continue Synthroid daily        Mixed hyperlipidemia   Assessment & Plan    Continue Zetia and simvastatin  Will resume Vytorin upon discharge          Bilateral lower extremity edema   Assessment & Plan    Lower extremity Doppler done revealed a Left Tibial vein thrombosis repeat is stable  Will remain on ASA 325mg daily and discuss the duration with ortho when  he sees them on 4/16/18  He has no shortness of breath or chest pain        Anemia due to blood loss   Assessment & Plan    Monitor hemoglobin at this time  Has remained stable around 12            Expected Discharge Date:  3/30/2018    Stable for discharge home tomorrow    Declines